# Patient Record
Sex: FEMALE | Race: WHITE | Employment: OTHER | URBAN - NONMETROPOLITAN AREA
[De-identification: names, ages, dates, MRNs, and addresses within clinical notes are randomized per-mention and may not be internally consistent; named-entity substitution may affect disease eponyms.]

---

## 2019-07-26 ENCOUNTER — HOSPITAL ENCOUNTER (OUTPATIENT)
Dept: WOMENS IMAGING | Age: 64
Discharge: HOME | End: 2019-07-26
Payer: COMMERCIAL

## 2019-07-26 ENCOUNTER — HOSPITAL ENCOUNTER (OUTPATIENT)
Dept: INTERVENTIONAL RADIOLOGY/VASCULAR | Age: 64
Discharge: HOME | End: 2019-07-26
Payer: COMMERCIAL

## 2019-07-26 DIAGNOSIS — M81.0 OSTEOPOROSIS, UNSPECIFIED OSTEOPOROSIS TYPE, UNSPECIFIED PATHOLOGICAL FRACTURE PRESENCE: ICD-10-CM

## 2019-07-26 DIAGNOSIS — I65.29 STENOSIS OF CAROTID ARTERY, UNSPECIFIED LATERALITY: ICD-10-CM

## 2019-07-26 DIAGNOSIS — Z00.6 ENCOUNTER FOR EXAMINATION FOR NORMAL COMPARISON OR CONTROL IN CLINICAL RESEARCH PROGRAM: ICD-10-CM

## 2019-07-26 DIAGNOSIS — Z12.31 VISIT FOR SCREENING MAMMOGRAM: ICD-10-CM

## 2019-07-26 PROCEDURE — 93880 EXTRACRANIAL BILAT STUDY: CPT

## 2019-07-26 PROCEDURE — 3209999900 MAM COMPARISON OF OUTSIDE IMAGES

## 2019-07-26 PROCEDURE — 77080 DXA BONE DENSITY AXIAL: CPT

## 2019-07-26 PROCEDURE — 77063 BREAST TOMOSYNTHESIS BI: CPT

## 2020-05-05 ENCOUNTER — HOSPITAL ENCOUNTER (OUTPATIENT)
Dept: NON INVASIVE DIAGNOSTICS | Age: 65
Discharge: HOME | End: 2020-05-05
Payer: COMMERCIAL

## 2020-05-05 VITALS — HEIGHT: 63 IN | BODY MASS INDEX: 31.18 KG/M2 | WEIGHT: 176 LBS

## 2020-05-05 LAB
LV EF: 60 %
LVEF MODALITY: NORMAL

## 2020-05-05 PROCEDURE — 93017 CV STRESS TEST TRACING ONLY: CPT | Performed by: INTERNAL MEDICINE

## 2020-05-05 PROCEDURE — A9500 TC99M SESTAMIBI: HCPCS | Performed by: FAMILY MEDICINE

## 2020-05-05 PROCEDURE — 3430000000 HC RX DIAGNOSTIC RADIOPHARMACEUTICAL: Performed by: FAMILY MEDICINE

## 2020-05-05 PROCEDURE — 78452 HT MUSCLE IMAGE SPECT MULT: CPT

## 2020-05-05 RX ADMIN — Medication 9.6 MILLICURIE: at 08:45

## 2020-05-05 RX ADMIN — Medication 33.1 MILLICURIE: at 09:55

## 2020-08-21 ENCOUNTER — HOSPITAL ENCOUNTER (OUTPATIENT)
Dept: WOMENS IMAGING | Age: 65
Discharge: HOME | End: 2020-08-21
Payer: COMMERCIAL

## 2020-08-21 PROCEDURE — 77067 SCR MAMMO BI INCL CAD: CPT

## 2020-09-23 ENCOUNTER — TELEPHONE (OUTPATIENT)
Dept: CASE MANAGEMENT | Age: 65
End: 2020-09-23

## 2020-09-23 NOTE — TELEPHONE ENCOUNTER
Name: Kellie Sainz  : 1955  MRN: [id number]    Oncology Navigation Follow-Up Note    Contact Type:  Telephone    Notes: Wants to wait on genetic evaluation she requested during mammogram visit 2020. Encouraged her to call if changes mind. Has contact information.     Electronically signed by Adi Gardner RN on 2020 at 12:24 PM

## 2020-09-23 NOTE — TELEPHONE ENCOUNTER
Name: Darnell Brady  : 1955  MRN: [id number]    Oncology Navigation Follow-Up Note    Contact Type:  Telephone    Notes: Attempted to reach patient re: request for genetic evaluation on Breast Cancer Risk Assessment, form completed 2020 at [facility] during mammogram visit. No answer, recording \"Voicemail full cannot accept new messages. Try your call again later. \"    Electronically signed by Rubio Danielson RN on 2020 at 11:44 AM

## 2021-09-03 ENCOUNTER — HOSPITAL ENCOUNTER (OUTPATIENT)
Dept: MAMMOGRAPHY | Age: 66
Discharge: HOME | End: 2021-09-03
Payer: COMMERCIAL

## 2021-09-03 DIAGNOSIS — Z12.39 ENCOUNTER FOR OTHER SCREENING FOR MALIGNANT NEOPLASM OF BREAST: ICD-10-CM

## 2021-09-03 PROCEDURE — 77063 BREAST TOMOSYNTHESIS BI: CPT

## 2021-09-08 ENCOUNTER — HOSPITAL ENCOUNTER (OUTPATIENT)
Dept: WOMENS IMAGING | Age: 66
Discharge: HOME | End: 2021-09-08

## 2021-09-08 DIAGNOSIS — R92.2 BREAST DENSITY: ICD-10-CM

## 2021-09-08 PROCEDURE — 76642 ULTRASOUND BREAST LIMITED: CPT

## 2021-09-08 PROCEDURE — 77065 DX MAMMO INCL CAD UNI: CPT

## 2022-01-26 ENCOUNTER — HOSPITAL ENCOUNTER (OUTPATIENT)
Dept: GENERAL RADIOLOGY | Age: 67
Discharge: HOME | End: 2022-01-26
Payer: MEDICARE

## 2022-01-26 ENCOUNTER — HOSPITAL ENCOUNTER (OUTPATIENT)
Age: 67
Discharge: HOME | End: 2022-01-26
Payer: MEDICARE

## 2022-01-26 DIAGNOSIS — R52 PAIN: ICD-10-CM

## 2022-01-26 PROCEDURE — 73030 X-RAY EXAM OF SHOULDER: CPT

## 2022-01-31 ENCOUNTER — HOSPITAL ENCOUNTER (OUTPATIENT)
Dept: WOMENS IMAGING | Age: 67
Discharge: HOME | End: 2022-01-31
Payer: MEDICARE

## 2022-01-31 DIAGNOSIS — N60.02 BREAST CYST, LEFT: ICD-10-CM

## 2022-01-31 DIAGNOSIS — Z09 FOLLOW UP: ICD-10-CM

## 2022-01-31 PROCEDURE — 76642 ULTRASOUND BREAST LIMITED: CPT

## 2022-01-31 PROCEDURE — G0279 TOMOSYNTHESIS, MAMMO: HCPCS

## 2022-02-08 ENCOUNTER — HOSPITAL ENCOUNTER (OUTPATIENT)
Dept: WOMENS IMAGING | Age: 67
Discharge: HOME | End: 2022-02-08
Payer: MEDICARE

## 2022-02-08 DIAGNOSIS — N63.25 MASS OVERLAPPING MULTIPLE QUADRANTS OF LEFT BREAST: ICD-10-CM

## 2022-02-08 PROCEDURE — 19084 BX BREAST ADD LESION US IMAG: CPT

## 2022-02-08 PROCEDURE — G0279 TOMOSYNTHESIS, MAMMO: HCPCS

## 2022-02-08 PROCEDURE — C1894 INTRO/SHEATH, NON-LASER: HCPCS

## 2022-02-08 PROCEDURE — 88305 TISSUE EXAM BY PATHOLOGIST: CPT

## 2022-02-08 NOTE — PROGRESS NOTES
Breast Biopsy Flowsheet/Post-Operative Care    Date of Procedure: 2/8/2022  Physician: Dr. Zeus Manzano  Technologist: Deloris Walsh RDMS    Biopsy:ultrasound guided breast biopsy  Lesion type: Non-palpable  Breast: left    Clock face position: Site #1: 3:00   UOQ     Site #2: 12:00  (2 adjacent areas) biopsied as one        Primary Method of Detection: Ultrasound       Asymmetry: asymmetric    Biopsy Method:   Sertera:    Site # 1    Gauge: 14    # of Passes: 4     Clip: Francisco Javier Maxweller:    Site # 2    Gauge: 14    # of Passes: 5     Clip:  Salome       Pre-Op Assessment: (BI-RADS)   4. Suspicious Abnormality    Patient Tolerated Procedure: good  Complications: none  Comments: none    Post Operative Care  Steri strips: Yes  Dressing: Gauze, Tape   Ice Applied to Site:  Yes  Evidence of Bleeding:  No    Pain Verbalized: No      Written Discharge Instructions: Yes  Condition at Discharge: good  Time of Discharge: 3043    Electronically signed by Sonali Pac on 2/8/2022 at 8:37 AM

## 2022-02-09 ENCOUNTER — CLINICAL DOCUMENTATION (OUTPATIENT)
Dept: WOMENS IMAGING | Age: 67
End: 2022-02-09

## 2022-02-09 NOTE — PROGRESS NOTES
Contact Type: [facility]    Contact Information: Arrived alone for biopsy results. After discussing with  Dr. Micah Carr, I discussed pathology and recommended excisional biopsy. All cards given for surgeons. Diagnosis: Intraductal papilloma    Patient Expresses Need(s) For:  Going to talk to her daughter about which surgeon to go to. Requests Referral to Doctor(s) with appointment(s): she will be calling us back soon for referral.  I told her if we did not hear back by Monday the 14th we would reach out to her. Biopsy site status: she was sore last night but feeling better today     Teaching sheets provided: Excisional biopsy, Needle localization, Papilloma    Notes: Explained terms doctor and navigators will discuss at next appointments. Questions addressed.        Results faxed to:  Dr. Sarita Bello

## 2022-02-22 ENCOUNTER — TELEPHONE (OUTPATIENT)
Dept: SURGERY | Age: 67
End: 2022-02-22

## 2022-02-22 ENCOUNTER — OFFICE VISIT (OUTPATIENT)
Dept: SURGERY | Age: 67
End: 2022-02-22
Payer: MEDICARE

## 2022-02-22 VITALS
HEART RATE: 77 BPM | HEIGHT: 63 IN | TEMPERATURE: 97.6 F | OXYGEN SATURATION: 98 % | BODY MASS INDEX: 33.22 KG/M2 | RESPIRATION RATE: 18 BRPM | SYSTOLIC BLOOD PRESSURE: 130 MMHG | DIASTOLIC BLOOD PRESSURE: 62 MMHG | WEIGHT: 187.5 LBS

## 2022-02-22 DIAGNOSIS — Z01.818 PRE-OP TESTING: ICD-10-CM

## 2022-02-22 DIAGNOSIS — D24.2 PAPILLOMA OF LEFT BREAST: Primary | ICD-10-CM

## 2022-02-22 PROCEDURE — 99202 OFFICE O/P NEW SF 15 MIN: CPT | Performed by: SURGERY

## 2022-02-22 RX ORDER — ACETAMINOPHEN 500 MG
500 TABLET ORAL EVERY 6 HOURS PRN
COMMUNITY

## 2022-02-22 RX ORDER — LEVOTHYROXINE SODIUM 50 UG/1
CAPSULE ORAL DAILY
COMMUNITY

## 2022-02-22 RX ORDER — MELOXICAM 15 MG/1
15 TABLET ORAL DAILY
COMMUNITY

## 2022-02-22 RX ORDER — MULTIVIT WITH MINERALS/LUTEIN
250 TABLET ORAL DAILY
COMMUNITY

## 2022-02-22 RX ORDER — LISINOPRIL 10 MG/1
10 TABLET ORAL DAILY
COMMUNITY

## 2022-02-22 RX ORDER — LANOLIN ALCOHOL/MO/W.PET/CERES
1000 CREAM (GRAM) TOPICAL DAILY
COMMUNITY

## 2022-02-22 RX ORDER — NAPROXEN 250 MG/1
250 TABLET ORAL PRN
COMMUNITY

## 2022-02-22 RX ORDER — CHOLECALCIFEROL (VITAMIN D3) 125 MCG
CAPSULE ORAL DAILY
COMMUNITY

## 2022-02-22 NOTE — TELEPHONE ENCOUNTER
Patient scheduled for surgery with Dr. Renee Diaz on 03- at 10:30am with an arrival of 7:30am at [facility] for a 8:00am needle loc. Preop order and instructions given. Surgery consent signed. Antibacterial soap given. Patient desired to postpone surgery do to going through therapy on her left arm.

## 2022-02-22 NOTE — TELEPHONE ENCOUNTER
[address]    Phone * [phone]   Surgical Scheduling Direct line Phone * [phone]  Fax * [phone]      Freda Ferguson      1955    female    [address] [facility] [address]   Marital Status:      Home Phone: [phone]   Cell Phone:   Telephone Information:   Mobile [phone]              Surgeon: Dr. Ricarda Dance  Surgery Date:03-   Time: 10:30am     Procedure: Left lumpectomy with preop needle localization at 8:00am   Outpatient     Diagnosis: Left breast papilloma    Important Medical History: In Epic    Special Inst/Equip:     CPT Codes: 17231    Latex Allergy:   no Cardiac Device:  no    Anesthesia Type: General    Case Location:  Main OR     Preadmission Testing: Phone Call      PAT Date and Time: ________________________________    PAT Confirmation #: _________________________________    Post Op Visit:  ______________________________________    Need Preop Cardiac Clearance:   no    Does patient have Cardiologist/physician?  No      Surgery Conformation #:  ______________________________________________    : __________________________________ Date:____________________        [employer] Name:  [employer]

## 2022-02-22 NOTE — PROGRESS NOTES
[address] [address]  Dept: [phone]  Dept Fax: [phone]  Loc: [phone]    Visit Date: 2/22/2022    Jostin Taylor is a 77 y.o. female who presentstoday for:  Chief Complaint   Patient presents with    Surgical Consult     New Patient referred by [facility] for Papilloma of left breast. Mammogram with needle biopsy completed 2-8-22. HPI:     HPI 26-year-old white female soon-to-be 79years old whose mother did have breast cancer and also has a daughter who is BRCA positive. Patient recently had a mammogram and then had an ultrasound recommended ultrasound of the left breast revealed a nodule at the 3 o'clock position and 2 nodules at the 12 o'clock position that were adjacent to each other she went biopsies of both areas. 3:00 proved to be fibroglandular tissue benign but at the 12 o'clock position also was shown to have fibrocystic changes but an intraductal papilloma. Patient had some nipple discharge with compression during the mammograms given the papilloma in the family history it was recommended she have this area removed completely and she is here for that discussion.   Patient had 4 pregnancies with the first child at age 25 the last at age 25 she had her onset of menses at 15 she did not breast-feed she did take birth control between the ages of 25 and 25 and then had a tubal ligation she has never had estrogen replacement and a pause was in her 46s at some point time she did have a previous breast biopsy in 05 per  that proved to be benign as mentioned she has a mother with breast cancer a father with prostate cancer and a daughter who is BRCA1 positive she has been followed with colonoscopies she is a former smoker    Past Medical History:   Diagnosis Date    HTN (hypertension)     Hypothyroid     Papilloma 2022    left breast      Past Surgical History:   Procedure Laterality Date    BACK SURGERY  2011    BREAST SURGERY Left 2005    fatty tumor    CARDIOVASCULAR STRESS TEST      CARPAL TUNNEL RELEASE Left 1992     SECTION      x 4    COLONOSCOPY  07/10/2017    Dr. Hernandez Rodríguez Left 2015    FOOT SURGERY Left 2017    PEG US GUID NDL BIOPSY LEFT Left 2022    PEG [address] LEFT 2022 Brian [facility] BREAST BIOPSY NEEDLE ADDITIONAL LEFT Left 2022    US BREAST BIOPSY NEEDLE ADDITIONAL LEFT 2022 Brian Elizabeth [facility]        Family History   Problem Relation Age of Onset    Breast Cancer Mother 79    Prostate Cancer Father     Hypertension Brother     Stroke Brother     No Known Problems Brother     No Known Problems Brother     No Known Problems Maternal Grandmother     No Known Problems Maternal Grandfather     No Known Problems Paternal Grandmother     No Known Problems Paternal Grandfather     BRCA 1 Positive Daughter         Social History     Tobacco Use    Smoking status: Former Smoker     Quit date: 9/3/2011     Years since quitting: 10.4    Smokeless tobacco: Never Used   Substance Use Topics    Alcohol use: Not Currently          No current outpatient medications on file. No current facility-administered medications for this visit. No Known Allergies    Subjective:      Review of Systems   Constitutional: Negative. Negative for activity change, appetite change, chills, diaphoresis, fatigue, fever and unexpected weight change. HENT: Negative. Negative for congestion, dental problem, drooling, ear discharge, ear pain, facial swelling, hearing loss, mouth sores, nosebleeds, postnasal drip, rhinorrhea, sinus pressure, sinus pain, sneezing, sore throat, tinnitus, trouble swallowing and voice change. Eyes: Negative. Negative for photophobia, pain, discharge, redness, itching and visual disturbance. Respiratory: Negative. Negative for apnea, cough, choking, chest tightness, shortness of breath, wheezing and stridor. Cardiovascular: Negative. Negative for chest pain, palpitations and leg swelling. Gastrointestinal: Negative. Endocrine: Negative. Negative for cold intolerance, heat intolerance, polydipsia, polyphagia and polyuria. Genitourinary: Negative. Negative for decreased urine volume, difficulty urinating, dyspareunia, dysuria, enuresis, flank pain, frequency, genital sores, hematuria, menstrual problem, pelvic pain, urgency, vaginal bleeding, vaginal discharge and vaginal pain. Musculoskeletal: Negative. Negative for arthralgias, back pain, gait problem, joint swelling, myalgias, neck pain and neck stiffness. Skin: Negative. Negative for color change, pallor, rash and wound. Allergic/Immunologic: Negative. Negative for environmental allergies, food allergies and immunocompromised state. Neurological: Negative. Negative for dizziness, tremors, seizures, syncope, facial asymmetry, speech difficulty, weakness, light-headedness, numbness and headaches. Hematological: Negative. Negative for adenopathy. Does not bruise/bleed easily. Psychiatric/Behavioral: Negative. Negative for agitation, behavioral problems, confusion, decreased concentration, dysphoric mood, hallucinations, self-injury, sleep disturbance and suicidal ideas. The patient is not nervous/anxious and is not hyperactive. Objective:   /62 (Site: Right Upper Arm, Position: Sitting, Cuff Size: Medium Adult)   Pulse 77   Temp 97.6 °F (36.4 °C) (Temporal)   Resp 18   Ht 5' 3\" (1.6 m)   Wt 187 lb 8 oz (85 kg)   SpO2 98%   BMI 33.21 kg/m²     Physical Exam  Constitutional:       Appearance: She is well-developed. HENT:      Head: Normocephalic and atraumatic. Eyes:      General: No scleral icterus. Right eye: No discharge. Left eye: No discharge.       Conjunctiva/sclera: Conjunctivae normal.      Pupils: Pupils are equal, round, and reactive to light. Neck:      Thyroid: No thyromegaly. Vascular: No JVD. Cardiovascular:      Rate and Rhythm: Normal rate and regular rhythm. Heart sounds: No murmur heard. No friction rub. No gallop. Pulmonary:      Effort: Pulmonary effort is normal. No respiratory distress. Breath sounds: Normal breath sounds. No stridor. No wheezing. Chest:      Chest wall: No tenderness. Abdominal:      General: There is no distension. Palpations: There is no mass. Tenderness: There is no abdominal tenderness. There is no guarding or rebound. Hernia: No hernia is present. Musculoskeletal:         General: Normal range of motion. Cervical back: Normal range of motion and neck supple. Skin:     General: Skin is warm and dry. Coloration: Skin is not pale. Findings: No erythema or rash. Neurological:      Mental Status: She is alert and oriented to person, place, and time. Psychiatric:         Behavior: Behavior normal.         Thought Content: Thought content normal.         Judgment: Judgment normal.            Results for orders placed or performed in visit on 02/18/22   Comprehensive Metabolic Panel   Result Value Ref Range    Glucose 93 65 - 99 mg/dL    BUN 19 5 - 27 mg/dL    CREATININE 0.57 0.40 - 1.00 mg/dL    eGFR African American >60 >59 ml/min/1.73sq.m    EGFR IF NonAfrican American >60 >59 ml/min/1.73sq. m    Calcium 9.0 8.5 - 10.5 mg/dL    Sodium 141 134 - 146 mmol/L    Potassium 4.3 3.5 - 5.0 mmol/L    Chloride 104 98 - 109 mmol/L    CO2 28 22 - 32 mmol/L    Anion Gap 9 5 - 15 mmol/L    Total Bilirubin 0.6 0.3 - 1.2 mg/dL    Alk Phosphatase 80 39 - 130 U/L    AST 17 0 - 41 U/L    ALT 21 0 - 31 U/L    Total Protein 6.9 6.0 - 8.0 g/dL    Albumin 4.0 3.2 - 5.3 g/dL   Lipid Panel w/ Reflex Direct LDL   Result Value Ref Range    Cholesterol 226 (H) 150 - 200 mg/dL    Triglycerides 157 (H) 27 -

## 2022-03-01 ENCOUNTER — TELEPHONE (OUTPATIENT)
Dept: SURGERY | Age: 67
End: 2022-03-01

## 2022-03-13 ASSESSMENT — ENCOUNTER SYMPTOMS
RESPIRATORY NEGATIVE: 1
RHINORRHEA: 0
SORE THROAT: 0
EYE REDNESS: 0
GASTROINTESTINAL NEGATIVE: 1
PHOTOPHOBIA: 0
BACK PAIN: 0
SINUS PAIN: 0
TROUBLE SWALLOWING: 0
EYES NEGATIVE: 1
FACIAL SWELLING: 0
CHOKING: 0
SHORTNESS OF BREATH: 0
CHEST TIGHTNESS: 0
COUGH: 0
EYE DISCHARGE: 0
STRIDOR: 0
ALLERGIC/IMMUNOLOGIC NEGATIVE: 1
EYE PAIN: 0
APNEA: 0
COLOR CHANGE: 0
WHEEZING: 0
EYE ITCHING: 0
SINUS PRESSURE: 0
VOICE CHANGE: 0

## 2022-03-18 ENCOUNTER — HOSPITAL ENCOUNTER (OUTPATIENT)
Age: 67
Discharge: HOME | End: 2022-03-18
Payer: MEDICARE

## 2022-03-18 DIAGNOSIS — Z01.818 PRE-OP TESTING: ICD-10-CM

## 2022-03-18 DIAGNOSIS — D24.2 PAPILLOMA OF LEFT BREAST: ICD-10-CM

## 2022-03-18 LAB
HCT VFR BLD CALC: 45.9 % (ref 37–47)
HEMOGLOBIN: 14.6 GM/DL (ref 12–16)

## 2022-03-18 PROCEDURE — 93005 ELECTROCARDIOGRAM TRACING: CPT

## 2022-03-18 PROCEDURE — 85014 HEMATOCRIT: CPT

## 2022-03-18 PROCEDURE — 36415 COLL VENOUS BLD VENIPUNCTURE: CPT

## 2022-03-18 PROCEDURE — 85018 HEMOGLOBIN: CPT

## 2022-03-19 LAB
EKG ATRIAL RATE: 89 BPM
EKG P AXIS: 11 DEGREES
EKG P-R INTERVAL: 166 MS
EKG Q-T INTERVAL: 366 MS
EKG QRS DURATION: 74 MS
EKG QTC CALCULATION (BAZETT): 445 MS
EKG R AXIS: 47 DEGREES
EKG T AXIS: 7 DEGREES
EKG VENTRICULAR RATE: 89 BPM

## 2022-03-19 PROCEDURE — 93010 ELECTROCARDIOGRAM REPORT: CPT | Performed by: INTERNAL MEDICINE

## 2022-03-21 ENCOUNTER — PREP FOR PROCEDURE (OUTPATIENT)
Dept: SURGERY | Age: 67
End: 2022-03-21

## 2022-03-21 RX ORDER — SODIUM CHLORIDE 9 MG/ML
INJECTION, SOLUTION INTRAVENOUS CONTINUOUS
Status: CANCELLED | OUTPATIENT
Start: 2022-03-21

## 2022-03-28 ENCOUNTER — ANESTHESIA (OUTPATIENT)
Dept: OPERATING ROOM | Age: 67
End: 2022-03-28
Payer: MEDICARE

## 2022-03-28 ENCOUNTER — HOSPITAL ENCOUNTER (OUTPATIENT)
Dept: WOMENS IMAGING | Age: 67
Discharge: HOME | End: 2022-03-28
Payer: MEDICARE

## 2022-03-28 ENCOUNTER — HOSPITAL ENCOUNTER (OUTPATIENT)
Age: 67
Setting detail: OUTPATIENT SURGERY
Discharge: HOME | End: 2022-03-28
Attending: SURGERY | Admitting: SURGERY
Payer: MEDICARE

## 2022-03-28 ENCOUNTER — ANESTHESIA EVENT (OUTPATIENT)
Dept: OPERATING ROOM | Age: 67
End: 2022-03-28
Payer: MEDICARE

## 2022-03-28 ENCOUNTER — TELEPHONE (OUTPATIENT)
Dept: SURGERY | Age: 67
End: 2022-03-28

## 2022-03-28 VITALS
HEART RATE: 75 BPM | DIASTOLIC BLOOD PRESSURE: 60 MMHG | TEMPERATURE: 98.6 F | RESPIRATION RATE: 20 BRPM | BODY MASS INDEX: 33.45 KG/M2 | SYSTOLIC BLOOD PRESSURE: 133 MMHG | HEIGHT: 63 IN | OXYGEN SATURATION: 94 % | WEIGHT: 188.8 LBS

## 2022-03-28 VITALS — DIASTOLIC BLOOD PRESSURE: 65 MMHG | OXYGEN SATURATION: 98 % | SYSTOLIC BLOOD PRESSURE: 137 MMHG

## 2022-03-28 DIAGNOSIS — D24.2 PAPILLOMA OF LEFT BREAST: ICD-10-CM

## 2022-03-28 DIAGNOSIS — N63.0 BREAST MASS: Primary | ICD-10-CM

## 2022-03-28 LAB — POTASSIUM SERPL-SCNC: 4.3 MEQ/L (ref 3.5–5.2)

## 2022-03-28 PROCEDURE — 19301 PARTIAL MASTECTOMY: CPT | Performed by: SURGERY

## 2022-03-28 PROCEDURE — 3700000000 HC ANESTHESIA ATTENDED CARE: Performed by: SURGERY

## 2022-03-28 PROCEDURE — 36415 COLL VENOUS BLD VENIPUNCTURE: CPT

## 2022-03-28 PROCEDURE — 7100000010 HC PHASE II RECOVERY - FIRST 15 MIN: Performed by: SURGERY

## 2022-03-28 PROCEDURE — 2709999900 HC NON-CHARGEABLE SUPPLY: Performed by: SURGERY

## 2022-03-28 PROCEDURE — 3700000001 HC ADD 15 MINUTES (ANESTHESIA): Performed by: SURGERY

## 2022-03-28 PROCEDURE — 76098 X-RAY EXAM SURGICAL SPECIMEN: CPT

## 2022-03-28 PROCEDURE — 19285 PERQ DEV BREAST 1ST US IMAG: CPT

## 2022-03-28 PROCEDURE — 3600000002 HC SURGERY LEVEL 2 BASE: Performed by: SURGERY

## 2022-03-28 PROCEDURE — G0279 TOMOSYNTHESIS, MAMMO: HCPCS

## 2022-03-28 PROCEDURE — 7100000011 HC PHASE II RECOVERY - ADDTL 15 MIN: Performed by: SURGERY

## 2022-03-28 PROCEDURE — 6360000002 HC RX W HCPCS

## 2022-03-28 PROCEDURE — 6370000000 HC RX 637 (ALT 250 FOR IP)

## 2022-03-28 PROCEDURE — 2580000003 HC RX 258

## 2022-03-28 PROCEDURE — 2580000003 HC RX 258: Performed by: NURSE ANESTHETIST, CERTIFIED REGISTERED

## 2022-03-28 PROCEDURE — 2500000003 HC RX 250 WO HCPCS: Performed by: SURGERY

## 2022-03-28 PROCEDURE — 88307 TISSUE EXAM BY PATHOLOGIST: CPT

## 2022-03-28 PROCEDURE — 7100000000 HC PACU RECOVERY - FIRST 15 MIN: Performed by: SURGERY

## 2022-03-28 PROCEDURE — 7100000001 HC PACU RECOVERY - ADDTL 15 MIN: Performed by: SURGERY

## 2022-03-28 PROCEDURE — 6360000002 HC RX W HCPCS: Performed by: NURSE ANESTHETIST, CERTIFIED REGISTERED

## 2022-03-28 PROCEDURE — 84132 ASSAY OF SERUM POTASSIUM: CPT

## 2022-03-28 PROCEDURE — 3600000012 HC SURGERY LEVEL 2 ADDTL 15MIN: Performed by: SURGERY

## 2022-03-28 RX ORDER — SODIUM CHLORIDE 0.9 % (FLUSH) 0.9 %
5-40 SYRINGE (ML) INJECTION PRN
Status: DISCONTINUED | OUTPATIENT
Start: 2022-03-28 | End: 2022-03-28 | Stop reason: HOSPADM

## 2022-03-28 RX ORDER — FENTANYL CITRATE 50 UG/ML
INJECTION, SOLUTION INTRAMUSCULAR; INTRAVENOUS PRN
Status: DISCONTINUED | OUTPATIENT
Start: 2022-03-28 | End: 2022-03-28 | Stop reason: SDUPTHER

## 2022-03-28 RX ORDER — ONDANSETRON 2 MG/ML
INJECTION INTRAMUSCULAR; INTRAVENOUS PRN
Status: DISCONTINUED | OUTPATIENT
Start: 2022-03-28 | End: 2022-03-28 | Stop reason: SDUPTHER

## 2022-03-28 RX ORDER — SODIUM CHLORIDE 9 MG/ML
INJECTION, SOLUTION INTRAVENOUS CONTINUOUS
Status: DISCONTINUED | OUTPATIENT
Start: 2022-03-28 | End: 2022-03-28 | Stop reason: HOSPADM

## 2022-03-28 RX ORDER — BUPIVACAINE HYDROCHLORIDE 5 MG/ML
INJECTION, SOLUTION PERINEURAL PRN
Status: DISCONTINUED | OUTPATIENT
Start: 2022-03-28 | End: 2022-03-28 | Stop reason: ALTCHOICE

## 2022-03-28 RX ORDER — ONDANSETRON 4 MG/1
4 TABLET, ORALLY DISINTEGRATING ORAL EVERY 8 HOURS PRN
Qty: 24 TABLET | Refills: 0 | Status: SHIPPED | OUTPATIENT
Start: 2022-03-28

## 2022-03-28 RX ORDER — LIDOCAINE HYDROCHLORIDE 20 MG/ML
INJECTION, SOLUTION INTRAVENOUS PRN
Status: DISCONTINUED | OUTPATIENT
Start: 2022-03-28 | End: 2022-03-28 | Stop reason: SDUPTHER

## 2022-03-28 RX ORDER — OXYCODONE HYDROCHLORIDE AND ACETAMINOPHEN 5; 325 MG/1; MG/1
1 TABLET ORAL ONCE
Status: COMPLETED | OUTPATIENT
Start: 2022-03-28 | End: 2022-03-28

## 2022-03-28 RX ORDER — SODIUM CHLORIDE, SODIUM LACTATE, POTASSIUM CHLORIDE, CALCIUM CHLORIDE 600; 310; 30; 20 MG/100ML; MG/100ML; MG/100ML; MG/100ML
INJECTION, SOLUTION INTRAVENOUS CONTINUOUS PRN
Status: DISCONTINUED | OUTPATIENT
Start: 2022-03-28 | End: 2022-03-28 | Stop reason: SDUPTHER

## 2022-03-28 RX ORDER — FENTANYL CITRATE 50 UG/ML
50 INJECTION, SOLUTION INTRAMUSCULAR; INTRAVENOUS EVERY 5 MIN PRN
Status: DISCONTINUED | OUTPATIENT
Start: 2022-03-28 | End: 2022-03-28 | Stop reason: HOSPADM

## 2022-03-28 RX ORDER — OXYCODONE HYDROCHLORIDE AND ACETAMINOPHEN 5; 325 MG/1; MG/1
TABLET ORAL
Status: COMPLETED
Start: 2022-03-28 | End: 2022-03-28

## 2022-03-28 RX ORDER — SODIUM CHLORIDE 9 MG/ML
INJECTION, SOLUTION INTRAVENOUS PRN
Status: DISCONTINUED | OUTPATIENT
Start: 2022-03-28 | End: 2022-03-28 | Stop reason: HOSPADM

## 2022-03-28 RX ORDER — MIDAZOLAM HYDROCHLORIDE 1 MG/ML
INJECTION INTRAMUSCULAR; INTRAVENOUS PRN
Status: DISCONTINUED | OUTPATIENT
Start: 2022-03-28 | End: 2022-03-28 | Stop reason: SDUPTHER

## 2022-03-28 RX ORDER — SODIUM CHLORIDE 0.9 % (FLUSH) 0.9 %
5-40 SYRINGE (ML) INJECTION EVERY 12 HOURS SCHEDULED
Status: DISCONTINUED | OUTPATIENT
Start: 2022-03-28 | End: 2022-03-28 | Stop reason: HOSPADM

## 2022-03-28 RX ORDER — PROPOFOL 10 MG/ML
INJECTION, EMULSION INTRAVENOUS PRN
Status: DISCONTINUED | OUTPATIENT
Start: 2022-03-28 | End: 2022-03-28 | Stop reason: SDUPTHER

## 2022-03-28 RX ORDER — FENTANYL CITRATE 50 UG/ML
25 INJECTION, SOLUTION INTRAMUSCULAR; INTRAVENOUS EVERY 5 MIN PRN
Status: DISCONTINUED | OUTPATIENT
Start: 2022-03-28 | End: 2022-03-28 | Stop reason: HOSPADM

## 2022-03-28 RX ORDER — ONDANSETRON 2 MG/ML
4 INJECTION INTRAMUSCULAR; INTRAVENOUS
Status: DISCONTINUED | OUTPATIENT
Start: 2022-03-28 | End: 2022-03-28 | Stop reason: HOSPADM

## 2022-03-28 RX ORDER — MEPERIDINE HYDROCHLORIDE 25 MG/ML
12.5 INJECTION INTRAMUSCULAR; INTRAVENOUS; SUBCUTANEOUS EVERY 5 MIN PRN
Status: DISCONTINUED | OUTPATIENT
Start: 2022-03-28 | End: 2022-03-28 | Stop reason: HOSPADM

## 2022-03-28 RX ORDER — OXYCODONE HYDROCHLORIDE AND ACETAMINOPHEN 5; 325 MG/1; MG/1
1 TABLET ORAL EVERY 6 HOURS PRN
Qty: 12 TABLET | Refills: 0 | Status: SHIPPED | OUTPATIENT
Start: 2022-03-28 | End: 2022-04-09

## 2022-03-28 RX ORDER — DEXAMETHASONE SODIUM PHOSPHATE 10 MG/ML
INJECTION, EMULSION INTRAMUSCULAR; INTRAVENOUS PRN
Status: DISCONTINUED | OUTPATIENT
Start: 2022-03-28 | End: 2022-03-28 | Stop reason: SDUPTHER

## 2022-03-28 RX ADMIN — OXYCODONE AND ACETAMINOPHEN 1 TABLET: 5; 325 TABLET ORAL at 13:11

## 2022-03-28 RX ADMIN — PHENYLEPHRINE HYDROCHLORIDE 200 MCG: 10 INJECTION INTRAVENOUS at 11:29

## 2022-03-28 RX ADMIN — PHENYLEPHRINE HYDROCHLORIDE 200 MCG: 10 INJECTION INTRAVENOUS at 11:42

## 2022-03-28 RX ADMIN — PHENYLEPHRINE HYDROCHLORIDE 200 MCG: 10 INJECTION INTRAVENOUS at 11:19

## 2022-03-28 RX ADMIN — LIDOCAINE HYDROCHLORIDE 100 MG: 20 INJECTION, SOLUTION INTRAVENOUS at 11:07

## 2022-03-28 RX ADMIN — FENTANYL CITRATE 100 MCG: 50 INJECTION, SOLUTION INTRAMUSCULAR; INTRAVENOUS at 11:07

## 2022-03-28 RX ADMIN — DEXAMETHASONE SODIUM PHOSPHATE 10 MG: 10 INJECTION, EMULSION INTRAMUSCULAR; INTRAVENOUS at 11:21

## 2022-03-28 RX ADMIN — SODIUM CHLORIDE: 9 INJECTION, SOLUTION INTRAVENOUS at 09:10

## 2022-03-28 RX ADMIN — MIDAZOLAM 2 MG: 1 INJECTION INTRAMUSCULAR; INTRAVENOUS at 11:00

## 2022-03-28 RX ADMIN — PROPOFOL 150 MG: 10 INJECTION, EMULSION INTRAVENOUS at 11:07

## 2022-03-28 RX ADMIN — SODIUM CHLORIDE, POTASSIUM CHLORIDE, SODIUM LACTATE AND CALCIUM CHLORIDE: 600; 310; 30; 20 INJECTION, SOLUTION INTRAVENOUS at 11:27

## 2022-03-28 RX ADMIN — ONDANSETRON 4 MG: 2 INJECTION INTRAMUSCULAR; INTRAVENOUS at 11:40

## 2022-03-28 RX ADMIN — OXYCODONE HYDROCHLORIDE AND ACETAMINOPHEN 1 TABLET: 5; 325 TABLET ORAL at 13:11

## 2022-03-28 RX ADMIN — Medication 2000 MG: at 11:15

## 2022-03-28 ASSESSMENT — PULMONARY FUNCTION TESTS
PIF_VALUE: 11
PIF_VALUE: 1
PIF_VALUE: 0
PIF_VALUE: 11
PIF_VALUE: 18
PIF_VALUE: 11
PIF_VALUE: 1
PIF_VALUE: 11
PIF_VALUE: 11
PIF_VALUE: 0
PIF_VALUE: 1
PIF_VALUE: 11
PIF_VALUE: 10
PIF_VALUE: 0
PIF_VALUE: 10
PIF_VALUE: 11
PIF_VALUE: 1
PIF_VALUE: 11
PIF_VALUE: 10
PIF_VALUE: 11
PIF_VALUE: 10
PIF_VALUE: 11
PIF_VALUE: 11
PIF_VALUE: 10
PIF_VALUE: 10
PIF_VALUE: 11
PIF_VALUE: 8
PIF_VALUE: 10
PIF_VALUE: 11
PIF_VALUE: 0
PIF_VALUE: 1
PIF_VALUE: 16
PIF_VALUE: 10
PIF_VALUE: 11
PIF_VALUE: 18
PIF_VALUE: 16
PIF_VALUE: 10
PIF_VALUE: 11
PIF_VALUE: 5
PIF_VALUE: 1
PIF_VALUE: 1
PIF_VALUE: 10
PIF_VALUE: 0
PIF_VALUE: 16

## 2022-03-28 ASSESSMENT — PAIN SCALES - GENERAL
PAINLEVEL_OUTOF10: 3
PAINLEVEL_OUTOF10: 3
PAINLEVEL_OUTOF10: 4
PAINLEVEL_OUTOF10: 6
PAINLEVEL_OUTOF10: 0

## 2022-03-28 ASSESSMENT — PAIN - FUNCTIONAL ASSESSMENT: PAIN_FUNCTIONAL_ASSESSMENT: 0-10

## 2022-03-28 NOTE — H&P
[address] [id number]                              HISTORY AND PHYSICAL    PATIENT NAME: Kalia Masterson                    :        1955  MED REC NO:   [id number]                           ROOM:  ACCOUNT NO:   [de-identified]                           ADMIT DATE: 2022  PROVIDER:     Jazlyn Omalley. Bill Lazaro MD    CHIEF COMPLAINT:  Intraductal papilloma, left breast; family history of  breast cancer. HISTORY OF PRESENT ILLNESS:  The patient is a 71-year-old white female  whose mother did have breast cancer, her daughter who is BRCA positive,  who on recent mammograms had nodules and then on ultrasound had a nodule  of the left breast at the 3 o'clock position and two nodules at the 12  o'clock position that were adjacent to each other. She underwent  biopsies of both areas. The one at 3 o'clock proved to be  fibroglandular tissue, benign. The 12 o'clock position has also had  fibrocystic changes, but an intraductal papilloma. Given the patient's  family history of breast cancer, it was felt this area should be removed  completely. She has had a previous breast biopsy performed in , but  she is here for needle localization and lumpectomy. PAST MEDICAL HISTORY:  Positive for hypertension, hypothyroidism. PAST SURGICAL HISTORY:  Includes  section x4. She has had a  carpal tunnel release. She has had lumbar disk surgery. She has had a  previous breast biopsy as mentioned above. She has had orthopedic  procedures on her left foot. She has had colonoscopies in the past.    FAMILY HISTORY:  Positive for breast cancer, prostate cancer,  hypertension. SOCIAL HISTORY:  The patient is a former smoker, quit 10-1/2 years ago. Alcohol:  None. MEDICATIONS:  None. PHYSICAL EXAMINATION:  GENERAL:  The patient is a 71-year-old female, appears her age.   HEAD, EARS, EYES, NOSE, AND THROAT:  Show no scleral icterus. CARDIOVASCULAR:  S1, S2.  LUNGS:  Respirations are clear. ABDOMEN:  Soft. Bowel sounds are positive. BREASTS:  Examining the breasts reveals no definitive palpable masses. There is some bruising from the previous biopsy. ASSESSMENT AND PLAN:  Left breast mass with intraductal papilloma with  family history of breast cancer. The patient is being admitted at this  time for needle localization, lumpectomy. CAMILLA BEE [facility], MD    D: 03/27/2022 20:44:27       T: 03/27/2022 20:47:44     [email or web address]  Job#: [id number]     Doc#: [id number]

## 2022-03-28 NOTE — ANESTHESIA POSTPROCEDURE EVALUATION
Department of Anesthesiology  Postprocedure Note    Patient: Cloretta Post  MRN: [id number]  YOB: 1955  Date of evaluation: 3/28/2022  Time:  1:23 PM     Procedure Summary     Date: 03/28/22 [facility] / [facility] [address] [address] [facility] [place] [facility]    Anesthesia Start: 1100 Anesthesia Stop: 1072    Procedure: LEFT BREAST LUMPECTOMY WITH PREOP NEEDLE LOC (Left Breast) Diagnosis: (LEFT BREAST PAPILLOMA)    Surgeons: Louie Mcclellan MD Responsible Provider: Pedro Tripathi DO    Anesthesia Type: general ASA Status: 3          Anesthesia Type: general    Reymundo Phase I: Reymundo Score: 10    Reymundo Phase II: Reymundo Score: 10    Last vitals: Reviewed and per EMR flowsheets.        Anesthesia Post Evaluation    Patient location during evaluation: PACU  Patient participation: complete - patient participated  Level of consciousness: awake and alert  Pain score: 3  Airway patency: patent  Nausea & Vomiting: no nausea and no vomiting  Complications: no  Cardiovascular status: hemodynamically stable and blood pressure returned to baseline  Respiratory status: spontaneous ventilation, room air and acceptable  Hydration status: stable

## 2022-03-28 NOTE — OP NOTE
[address] [id number]                                OPERATIVE REPORT    PATIENT NAME: Elida Bolanos                    :        1955  MED REC NO:   [id number]                           ROOM:  ACCOUNT NO:   [de-identified]                           ADMIT DATE: 2022  PROVIDER:     Miryam Haynes. Patrice Capone MD    DATE OF PROCEDURE:  2022    PREOPERATIVE DIAGNOSIS:  Intraductal papilloma of the left breast.    POSTOPERATIVE DIAGNOSIS:  Intraductal papilloma of the left breast.    OPERATIONS PERFORMED:  1. Preoperative needle localization. 2.  Lumpectomy left breast.    SURGEON:  Miryam Haynes. MD Jose Daniel    ANESTHESIA:  General.    COMPLICATIONS:  None. BLOOD LOSS:  5 mL. INDICATIONS FOR PROCEDURE:  The patient is a 80-year-old white female  who had an ultrasound and mammogram revealing a bilobed nodule in the  left breast.  Actually, there were two separate nodules; one was  biopsied was fibrocystic tissue, the other was biopsied was same, but  also had an intraductal papilloma element to it. The patient has a  strong family history of breast cancer in mother and daughter, and she  is here for lumpectomy. FINDINGS:  The [address] reported that the mass in question was  within the specimen given. DESCRIPTION OF PROCEDURE:  The patient was brought into the hospital and  went to the [address] [facility] where she underwent a needle localization. She was brought to Surgery and after adequate inhalational anesthesia  was administered, incision was made over the entrance of the guidewire  going down into the breast tissue and a core of breast tissue was  removed going down to the tip of the needle. The tip of the needle was  seen. The specimen was marked and sent for the [address] [facility]. Hemostasis was obtained with electrocautery and then we maintained some  pressure for a period of time.   Report came back that the mass in  question was within the specimen given and then the subcutaneous was  closed with interrupted 3-0 Vicryl sutures and running 4-0 Vicryl was  used to close the skin. Steri-Strips were applied. CAMILLA BEE [facility], MD    D: 03/28/2022 12:20:38       T: 03/28/2022 12:24:08     [email or web address]  Job#: [id number]     Doc#: [id number]    CC:

## 2022-03-28 NOTE — H&P
[facility]  History and Physical Update      Pt Name: Kelli White  MRN: [id number]  YOB: 1955  Date of evaluation: 3/27/2022    [x] I have examined the patient and reviewed the H&P/Consult and there are no changes to the patient or plans. [] I have examined the patient and reviewed the H&P/Consult and have noted the following changes:         Francy Baker MD  Electronically signed 3/27/2022 at 8:34 PM

## 2022-03-28 NOTE — PROGRESS NOTES
Pt admitted to [facility] room 10 and oriented to unit. SCD sleeves applied. Nares swabbed. Pt verbalized permission for first name, last initial and physicians name on white board. SDS board and discharge criteria explained, pt and family verbalized understanding. Pt denies thoughts of harming self or others. Call light in reach. Family at the bedside.

## 2022-03-28 NOTE — PROGRESS NOTES

## 2022-03-28 NOTE — BRIEF OP NOTE
Brief Postoperative Note      Patient: Shaun Chavez  YOB: 1955  MRN: [id number]    Date of Procedure: 3/28/2022    Pre-Op Diagnosis: LEFT BREAST PAPILLOMA    Post-Op Diagnosis: same       Procedure(s):  LEFT BREAST LUMPECTOMY WITH PREOP NEEDLE LOC    Surgeon(s):  Hayley Campo MD    Assistant:  First Assistant: Lynden Krabbe    Anesthesia: General    Estimated Blood Loss (mL): 10 ml    Complications: none    Specimens:   ID Type Source Tests Collected by Time Destination   A : LEFT BREAST TISSUE Tissue Breast SURGICAL PATHOLOGY Hayley Campo MD 3/28/2022 1027        Implants:        Drains: none    Findings: see op note    Electronically signed by Hayley Campo MD on 3/28/2022 at 12:03 PM

## 2022-03-28 NOTE — ANESTHESIA PRE PROCEDURE
Department of Anesthesiology  Preprocedure Note       Name:  Kalin Szymanski   Age:  79 y.o.  :  1955                                          MRN:  [id number]         Date:  3/28/2022      Surgeon: Reese Rankin):  Gela Bernstein MD    Procedure: Procedure(s):  LEFT BREAST LUMPECTOMY WITH PREOP NEEDLE LOC    Medications prior to admission:   Prior to Admission medications    Medication Sig Start Date End Date Taking? Authorizing Provider   Cholecalciferol (VITAMIN D3) 50 MCG (2000) TABS Take by mouth daily    Historical Provider, MD   Calcium Carb-Cholecalciferol (CALCIUM 600 + D PO) Take by mouth daily    Historical Provider, MD   Probiotic Product (PROBIOTIC-10 PO) Take by mouth daily    Historical Provider, MD   Ascorbic Acid (VITAMIN C) 250 MG tablet Take 250 mg by mouth daily    Historical Provider, MD   vitamin B-12 (CYANOCOBALAMIN) 1000 MCG tablet Take 1,000 mcg by mouth daily    Historical Provider, MD   Levothyroxine Sodium 50 MCG CAPS Take by mouth Daily    Historical Provider, MD   lisinopril (PRINIVIL;ZESTRIL) 10 MG tablet Take 10 mg by mouth daily    Historical Provider, MD   meloxicam (MOBIC) 15 MG tablet Take 15 mg by mouth daily    Historical Provider, MD   naproxen (NAPROSYN) 250 MG tablet Take 250 mg by mouth as needed for Pain    Historical Provider, MD   acetaminophen (TYLENOL) 500 MG tablet Take 500 mg by mouth every 6 hours as needed for Pain    Historical Provider, MD       Current medications:    Current Facility-Administered Medications   Medication Dose Route Frequency Provider Last Rate Last Admin    0.9 % sodium chloride infusion   IntraVENous Continuous Gale Ibanez  mL/hr at  0910 New Bag at 22 0910    ceFAZolin (ANCEF) 2000 mg in sterile water 20 mL IV syringe  2,000 mg IntraVENous 30 Min Pre-Op Gale Ibanez LPN           Allergies:  No Known Allergies    Problem List:  There is no problem list on file for this patient.       Past Medical History: Diagnosis Date    HTN (hypertension)     Hypothyroid     Papilloma     left breast       Past Surgical History:        Procedure Laterality Date    BACK SURGERY  2011    BREAST SURGERY Left 2005    Lucchese benign    CARDIOVASCULAR STRESS TEST      CARPAL TUNNEL RELEASE Left 1992     SECTION      x 4    COLONOSCOPY  07/10/2017    Dr. Skinny Gayle Left 2015    FOOT SURGERY Left 2017    PEG US GUID NDL BIOPSY LEFT Left 2022    PEG [address] LEFT [facility]22 [facility]    TUBAL LIGATION      US BREAST BIOPSY NEEDLE ADDITIONAL LEFT Left 2022    US BREAST BIOPSY NEEDLE ADDITIONAL LEFT 2022 [facility]       Social History:    Social History     Tobacco Use    Smoking status: Former Smoker     Quit date: 9/3/2011     Years since quitting: 10.5    Smokeless tobacco: Never Used   Substance Use Topics    Alcohol use: Not Currently                                Counseling given: Not Answered      Vital Signs (Current):   Vitals:    22 0833   BP: (!) 110/57   Pulse: 80   Resp: 20   Temp: 98.8 °F (37.1 °C)   TempSrc: Tympanic   SpO2: 96%   Weight: 188 lb 12.8 oz (85.6 kg)   Height: 5' 3\" (1.6 m)                                              BP Readings from Last 3 Encounters:   22 (!) 110/57   22 130/62       NPO Status: Time of last liquid consumption: 620                        Time of last solid consumption:                         Date of last liquid consumption: 22                        Date of last solid food consumption: 22    BMI:   Wt Readings from Last 3 Encounters:   22 188 lb 12.8 oz (85.6 kg)   22 187 lb 8 oz (85 kg)   20 176 lb (79.8 kg)     Body mass index is 33.44 kg/m².     CBC:   Lab Results   Component Value Date    WBC 6.7 2021    RBC 4.53 2021    HGB 14.6 2022    HCT 45.9 2022 MCV 90.9 08/20/2021    RDW 13.0 08/20/2021     08/20/2021       CMP:   Lab Results   Component Value Date     02/18/2022    K 4.3 03/28/2022     02/18/2022    CO2 28 02/18/2022    BUN 19 02/18/2022    CREATININE 0.57 02/18/2022    GLUCOSE 93 02/18/2022    PROT 6.9 02/18/2022    CALCIUM 9.0 02/18/2022    BILITOT 0.6 02/18/2022    ALKPHOS 80 02/18/2022    ALKPHOS 75 08/20/2021    AST 17 02/18/2022    ALT 21 02/18/2022       POC Tests: No results for input(s): POCGLU, POCNA, POCK, POCCL, POCBUN, POCHEMO, POCHCT in the last 72 hours. Coags: No results found for: PROTIME, INR, APTT    HCG (If Applicable): No results found for: PREGTESTUR, PREGSERUM, HCG, HCGQUANT     ABGs: No results found for: PHART, PO2ART, FRE5WQU, JXG5JTX, BEART, D4XMCULD     Type & Screen (If Applicable):  No results found for: LABABO, LABRH    Drug/Infectious Status (If Applicable):  No results found for: HIV, HEPCAB    COVID-19 Screening (If Applicable): No results found for: COVID19        Anesthesia Evaluation  Patient summary reviewed and Nursing notes reviewed no history of anesthetic complications:   Airway: Mallampati: III  TM distance: >3 FB   Neck ROM: full  Mouth opening: > = 3 FB Dental:          Pulmonary:normal exam  breath sounds clear to auscultation                            ROS comment: Former smoker   Cardiovascular:  Exercise tolerance: good (>4 METS),   (+) hypertension:,       ECG reviewed                        Neuro/Psych:   Negative Neuro/Psych ROS              GI/Hepatic/Renal:            ROS comment: obesity. Endo/Other:    (+) hypothyroidism::., .          Pt had no PAT visit       Abdominal:   (+) obese,     Abdomen: soft. Vascular: negative vascular ROS. Other Findings:             Anesthesia Plan      general     ASA 3       Induction: intravenous. MIPS: Postoperative opioids intended and Prophylactic antiemetics administered.   Anesthetic plan and risks discussed with patient and child/children. Plan discussed with CRNA.                   [address] Conchis Miller, DO   3/28/2022

## 2022-03-28 NOTE — PROGRESS NOTES
1201- Pt arrived to PACU alert and without distress. VSS.    1215- Ice pack applied to left breast.     1245- Pt transferred to same day surgery at this time in stable condition. Handoff given to ANTONIO Carr.

## 2022-03-28 NOTE — PROGRESS NOTES
Pt returned to [facility] room 10. Vitals and assessment as charted. 0.9 infusing, @500ml to count from PACU. Pt has crackers and coffee. Family at the bedside. Pt and family verbalized understanding of discharge criteria and call light use. Call light in reach.

## 2022-03-29 ENCOUNTER — TELEPHONE (OUTPATIENT)
Dept: SURGERY | Age: 67
End: 2022-03-29

## 2022-03-29 NOTE — TELEPHONE ENCOUNTER
Called patient to see how she is feeling today after her procedure yesterday with Dr. Trell Linda. Patient states she is doing well and is  pain. She is ambulating, urinating, eating and drinking without difficulty. She is to call the office with any questions or concerns.

## 2022-04-01 ENCOUNTER — TELEPHONE (OUTPATIENT)
Dept: SURGERY | Age: 67
End: 2022-04-01

## 2022-04-01 NOTE — TELEPHONE ENCOUNTER
Pt calling office stating that she has a rash under her left axilla. She is status post left breast lumpectomy 3/28/2022. She states that it itches and burns. Pt advised to use Hydrocortisone cream or Benadryl cream to the area to help with that; informed that it could be from the prep before surgery, or any tape/glue that was used. She voices understanding. She denies any s/sx of infection. Advised to call the office with worsening symptoms.

## 2022-04-14 ENCOUNTER — OFFICE VISIT (OUTPATIENT)
Dept: SURGERY | Age: 67
End: 2022-04-14
Payer: MEDICARE

## 2022-04-14 VITALS
OXYGEN SATURATION: 95 % | SYSTOLIC BLOOD PRESSURE: 128 MMHG | HEART RATE: 60 BPM | BODY MASS INDEX: 33.59 KG/M2 | TEMPERATURE: 96.6 F | WEIGHT: 189.6 LBS | DIASTOLIC BLOOD PRESSURE: 80 MMHG | RESPIRATION RATE: 18 BRPM | HEIGHT: 63 IN

## 2022-04-14 DIAGNOSIS — D24.2 PAPILLOMA OF LEFT BREAST: Primary | ICD-10-CM

## 2022-04-14 DIAGNOSIS — Z09 POSTOP CHECK: ICD-10-CM

## 2022-04-14 DIAGNOSIS — Z98.890 STATUS POST LEFT BREAST LUMPECTOMY: ICD-10-CM

## 2022-04-14 PROCEDURE — 99024 POSTOP FOLLOW-UP VISIT: CPT | Performed by: SURGERY

## 2022-04-14 NOTE — PROGRESS NOTES
[address] [address]  Dept: [phone]  Dept Fax: [phone]  Loc: [phone]    Visit Date: 2022    Stacy Lee is a 79 y.o. female who presentstoday for:  Chief Complaint   Patient presents with    Post-Op Check     s/p 3/28 1. Preoperative needle localization. 2.  Lumpectomy left breast.       HPI:     HPI as above status post lumpectomy with preoperative needle localization. Pathology proved to be all benign intraductal papilloma was removed.   Patient has some minimal discomfort but otherwise no problems postop    Past Medical History:   Diagnosis Date    HTN (hypertension)     Hypothyroid     Papilloma     left breast      Past Surgical History:   Procedure Laterality Date    BACK SURGERY  2011    BREAST LUMPECTOMY Left 3/28/2022    LEFT BREAST LUMPECTOMY WITH PREOP NEEDLE LOC performed by Lidia Thomas MD at [address] Left 2005    [facility] benign    CARDIOVASCULAR STRESS TEST      CARPAL TUNNEL RELEASE Left 1992     SECTION      x 4    COLONOSCOPY  07/10/2017    Dr. Campbell [place] Left 2015    FOOT SURGERY Left 2017    PEG US GUID NDL BIOPSY LEFT Left 2022    PEG [address] LEFT 2022 [facility] Capone [facility]    TUBAL LIGATION      US BREAST BIOPSY NEEDLE ADDITIONAL LEFT Left 2022    US BREAST BIOPSY NEEDLE ADDITIONAL LEFT 2022 Winston Capone [facility]    US GUIDED NEEDLE LOC OF LEFT BREAST Left 3/28/2022    US GUIDED NEEDLE LOC OF LEFT BREAST 3/28/2022 [facility]        Family History   Problem Relation Age of Onset    Breast Cancer Mother 79    Prostate Cancer Father     Hypertension Brother     Stroke Brother     No Known Problems Brother     Stroke Brother     No Known Problems Maternal Grandmother     No Known Problems Maternal Grandfather     No Known Problems Paternal Grandmother     No Known Problems Paternal Grandfather     BRCA 1 Positive Daughter         Social History     Tobacco Use    Smoking status: Former Smoker     Quit date: 9/3/2011     Years since quitting: 10.6    Smokeless tobacco: Never Used   Substance Use Topics    Alcohol use: Not Currently          Current Outpatient Medications   Medication Sig Dispense Refill    ondansetron (ZOFRAN-ODT) 4 MG disintegrating tablet Take 1 tablet by mouth every 8 hours as needed for Nausea or Vomiting 24 tablet 0    Cholecalciferol (VITAMIN D3) 50 MCG (2000 UT) TABS Take by mouth daily      Calcium Carb-Cholecalciferol (CALCIUM 600 + D PO) Take by mouth daily      Probiotic Product (PROBIOTIC-10 PO) Take by mouth daily      Ascorbic Acid (VITAMIN C) 250 MG tablet Take 250 mg by mouth daily      vitamin B-12 (CYANOCOBALAMIN) 1000 MCG tablet Take 1,000 mcg by mouth daily      Levothyroxine Sodium 50 MCG CAPS Take by mouth Daily      lisinopril (PRINIVIL;ZESTRIL) 10 MG tablet Take 10 mg by mouth daily      meloxicam (MOBIC) 15 MG tablet Take 15 mg by mouth daily      naproxen (NAPROSYN) 250 MG tablet Take 250 mg by mouth as needed for Pain      acetaminophen (TYLENOL) 500 MG tablet Take 500 mg by mouth every 6 hours as needed for Pain       No current facility-administered medications for this visit.      No Known Allergies    Subjective:      Review of Systems    Objective:   /80 (Site: Left Upper Arm, Position: Sitting, Cuff Size: Medium Adult)   Pulse 60   Temp 96.6 °F (35.9 °C) (Temporal)   Resp 18   Ht 5' 3\" (1.6 m)   Wt 189 lb 9.6 oz (86 kg)   SpO2 95%   BMI 33.59 kg/m²     Physical Exam wound looks good Steri-Strips removed no signs of any infection       Results for orders placed or performed during the hospital encounter of 03/28/22   Potassium   Result Value Ref Range    Potassium 4.3 3.5 - 5.2 meq/L       Assessment:     Status post lumpectomy of the breast intraductal papilloma was removed pathology was benign. However patient's mother and daughter have both had breast cancer and daughter was BRCA positive. Discussed potential genetic testing for the patient.   Will discuss with nurse navigator and see if she would qualify for genetic testing addendum did talk with Kwesi Iyer nurse navigator at the breast clinic she will contact patient and will consider genetic testing    Plan:     As above      Electronicallysigned by Omi Guillory MD on 4/14/2022 at 11:38 AM

## 2022-04-20 ENCOUNTER — TELEPHONE (OUTPATIENT)
Dept: CASE MANAGEMENT | Age: 67
End: 2022-04-20

## 2022-04-20 NOTE — TELEPHONE ENCOUNTER
Name: Destinee Colon  : 1955  MRN: [id number]    Oncology Navigation- Initial Call:    Notes: Referred from Dr. Racheal Maradiaga for genetic testing, daughter BRCA1 positive. Patient had recent excisional biopsy for papilloma on 3/28/2022, pathology benign. Left  with contact number for patient to call back. Need to assess for needs, verify personal and family history, and complete teaching about GC referral and testing with Jens Mace. Will await return call.     Electronically signed by Janene Robles RN on 2022 at 3:24 PM

## 2022-04-25 ENCOUNTER — TELEPHONE (OUTPATIENT)
Dept: CASE MANAGEMENT | Age: 67
End: 2022-04-25

## 2022-04-27 NOTE — TELEPHONE ENCOUNTER
Name: Mirna Altamirano  : 1955  MRN: [id number]  AGE: 79 y.o. Oncology Navigation- Initial Note:    Intake-  Contact Type: Telephone    Diagnosis: High Risk for Breast Cancer    High Risk Category: First Degree Relative with Known Genetic Mutation, dtr BRCA1 POSITIVE     Home Disposition: Lives with other who is able to assist    Needs and barriers to care: Coordination of Care and Knowledge deficit    Referral Source: Surgeon-Dr. Matthew Lazo    Receptive to Risk Reduction Stategies:   yes - states she talked over with Dr. Matthew Lazo and interested. Breast Imaging-    Date of Last Mammogram: 9/3/2021 last screen, resend left diagnostic for papilloma 2022      Date of Last Breast MRI: does not meet ACS criteria    Personal Risk Factors-    Lifetime Tyrer-Cuzick Score: 15.7 %     Previous History of Cancer: no, but has had at least 4 colon polyps, last 2 on 7/10/2017 hyperplastic, sessiled polyps. Family History of Cancer: Breast Cancer mother, age 79; Prostate Cancer father, age 76. No family history of colon, ovarian, or pancreatic cancer. Previous Genetic Testing: no    Meets Criteria for Genetic Evaluation: no    Oral Contraceptive or Hormone Replacement Therapy: yes - BC x 9 years, last age 32. No hormone replacement per patient. Previous Hysterectomy: no    Menopausal Status: Postmenopausal, age 54     Prior Breast Biopsies: yes -  left benign intramammary node excised by Dr. Antonio Lanier. 3/28/2022 left papilloma-benign excised by Dr. Matthew Lazo. Prior Chemoprevention: no    Additional Risk Factors: yes - 30 year smoking hx, quit . Interventions-   General Interventions: Called back today and wants to get genetic testing as discussed with her surgeon. Genetic assessment completed on phone and family hx updated. Education/Screenings: Genetic counseling and testing-brochure, scheduling instructions, questionnaire.      Referrals: [facility]     Continuum of Care: Initiation of High Risk Pathway    Notes: Teaching completed and verbalizes understanding, Questions addressed. Mailed packet as above with my contact number. Asked patient to notify me when packet received so I can fax [phone] referral form. Will follow.       Electronically signed by Emily Herrera RN on 4/27/2022 at 2:16 PM

## 2022-05-09 ENCOUNTER — TELEPHONE (OUTPATIENT)
Dept: CASE MANAGEMENT | Age: 67
End: 2022-05-09

## 2022-05-09 NOTE — TELEPHONE ENCOUNTER
Name: Ghazal Chow  : 1955  MRN: [id number]    Oncology Navigation Follow-Up Note    Contact Type:  Telephone    Notes: Patient received her genetic packet I mailed. Brought in daughter's genetic result showing BRCA2 mutation. Faxed non-urgent GC referral. Patient aware to watch email to get consult scheduled and have questionnaire completed. Verbalizes understanding.     Electronically signed by Benita Cheung RN on 2022 at 8:32 AM

## 2022-07-11 ENCOUNTER — TELEPHONE (OUTPATIENT)
Dept: SURGERY | Age: 67
End: 2022-07-11

## 2022-07-11 NOTE — TELEPHONE ENCOUNTER
Patient underwent a left lumpectomy w/ preop needle loc on 03- for an intraductal papilloma. Patient is having clear nipple discharge from the left breast again. Patient states that is what happened when they diagnosed her with the intraductal papilloma. Does the patient need to have a mammogram, ultrasound or office visit?   Thank you

## 2022-07-12 NOTE — TELEPHONE ENCOUNTER
Per Dr. Adama Ramirez patient needs an appointment to be evaluated.   Left a message for the patient to schedule an appointment

## 2022-07-19 ENCOUNTER — OFFICE VISIT (OUTPATIENT)
Dept: SURGERY | Age: 67
End: 2022-07-19
Payer: MEDICARE

## 2022-07-19 VITALS
SYSTOLIC BLOOD PRESSURE: 110 MMHG | WEIGHT: 184.8 LBS | HEART RATE: 84 BPM | BODY MASS INDEX: 32.74 KG/M2 | OXYGEN SATURATION: 96 % | TEMPERATURE: 96.5 F | HEIGHT: 63 IN | DIASTOLIC BLOOD PRESSURE: 62 MMHG | RESPIRATION RATE: 18 BRPM

## 2022-07-19 DIAGNOSIS — N64.52 NIPPLE DISCHARGE: Primary | ICD-10-CM

## 2022-07-19 DIAGNOSIS — D24.2 INTRADUCTAL PAPILLOMA OF BREAST, LEFT: ICD-10-CM

## 2022-07-19 PROCEDURE — 99213 OFFICE O/P EST LOW 20 MIN: CPT | Performed by: SURGERY

## 2022-07-19 PROCEDURE — 1123F ACP DISCUSS/DSCN MKR DOCD: CPT | Performed by: SURGERY

## 2022-07-19 RX ORDER — ATORVASTATIN CALCIUM 20 MG/1
20 TABLET, FILM COATED ORAL DAILY
COMMUNITY
Start: 2022-06-20

## 2022-07-19 ASSESSMENT — ENCOUNTER SYMPTOMS
EYE ITCHING: 0
CHEST TIGHTNESS: 0
GASTROINTESTINAL NEGATIVE: 1
EYES NEGATIVE: 1
BACK PAIN: 0
ALLERGIC/IMMUNOLOGIC NEGATIVE: 1
COUGH: 0
SINUS PRESSURE: 0
COLOR CHANGE: 0
RESPIRATORY NEGATIVE: 1
EYE REDNESS: 0
FACIAL SWELLING: 0
CHOKING: 0
EYE PAIN: 0
APNEA: 0
SINUS PAIN: 0
SHORTNESS OF BREATH: 0
EYE DISCHARGE: 0
TROUBLE SWALLOWING: 0
SORE THROAT: 0
VOICE CHANGE: 0
PHOTOPHOBIA: 0
RHINORRHEA: 0
STRIDOR: 0
WHEEZING: 0

## 2022-07-19 NOTE — PROGRESS NOTES
[address] [facility] [address]  Dept: [phone]  Dept Fax: [phone]  Loc: [phone]    Visit Date: 2022    Lorena Tejada is a 79 y.o. female who presentstoday for:  Chief Complaint   Patient presents with    Follow-up     s/p Lumpectomy left breast -3/28/2022 clear nipple discharge-Last seen in the office on 2022       HPI:     HPI as above 80-year-old white female who had undergone a left breast lumpectomy 3 months ago. Patient had an intraductal papilloma that was removed. However over the last several days she has had onset of clear drainage from the left nipple blood denies any trauma. However the drainage has been persistent here for my evaluation.   Should also be noted that patient has a family history of breast cancer but per nurse navigator was not felt to be a candidate for genetic testing    Past Medical History:   Diagnosis Date    Colon polyps     several removed-Dr. Sue Bernal (no path available)    HTN (hypertension)     Hyperlipidemia     Hyperplastic colon polyp 07/10/2017    2 sessiled polyps removed-Dr. Sahni Brought    Hypothyroid     Papilloma     left breast      Past Surgical History:   Procedure Laterality Date    BACK SURGERY  2011    BREAST LUMPECTOMY Left 3/28/2022    LEFT BREAST LUMPECTOMY WITH PREOP NEEDLE LOC performed by Billey Soulier, MD at [facility] [address] Left 2005    Lucchese benign    CARDIOVASCULAR STRESS TEST  2020    CARPAL TUNNEL RELEASE Left 1992     SECTION      x 4    COLONOSCOPY  07/10/2017    Dr. Bruno Watts Left 2015    FOOT SURGERY Left 2017    PEG US GUID NDL BIOPSY LEFT Left 2022    PEG [address] LEFT 2022 MD Beata [address] BREAST BIOPSY NEEDLE ADDITIONAL LEFT Left 2022    US BREAST BIOPSY NEEDLE ADDITIONAL LEFT 2022 Aydee Abbott MD [address] GUIDED NEEDLE LOC OF LEFT BREAST Left 3/28/2022    US GUIDED NEEDLE LOC OF LEFT BREAST 3/28/2022 [facility] Vaughankika Maldonado [place] [address]        Family History   Problem Relation Age of Onset    Breast Cancer Mother 79    Prostate Cancer Father 76    Hypertension Brother     Stroke Brother     No Known Problems Brother     Stroke Brother     No Known Problems Maternal Grandmother     No Known Problems Maternal Grandfather     No Known Problems Paternal Grandmother     No Known Problems Paternal Grandfather     BRCA 1 Positive Daughter         Social History     Tobacco Use    Smoking status: Former     Types: Cigarettes     Quit date: 9/3/2011     Years since quitting: 10.8    Smokeless tobacco: Never   Substance Use Topics    Alcohol use: Not Currently          Current Outpatient Medications   Medication Sig Dispense Refill    atorvastatin (LIPITOR) 20 MG tablet Take 20 mg by mouth in the morning. ondansetron (ZOFRAN-ODT) 4 MG disintegrating tablet Take 1 tablet by mouth every 8 hours as needed for Nausea or Vomiting 24 tablet 0    Cholecalciferol (VITAMIN D3) 50 MCG (2000 UT) TABS Take by mouth daily      Calcium Carb-Cholecalciferol (CALCIUM 600 + D PO) Take by mouth daily      Probiotic Product (PROBIOTIC-10 PO) Take by mouth daily      Ascorbic Acid (VITAMIN C) 250 MG tablet Take 250 mg by mouth daily      vitamin B-12 (CYANOCOBALAMIN) 1000 MCG tablet Take 1,000 mcg by mouth daily      Levothyroxine Sodium 50 MCG CAPS Take by mouth Daily      lisinopril (PRINIVIL;ZESTRIL) 10 MG tablet Take 10 mg by mouth daily      meloxicam (MOBIC) 15 MG tablet Take 15 mg by mouth daily      naproxen (NAPROSYN) 250 MG tablet Take 250 mg by mouth as needed for Pain      acetaminophen (TYLENOL) 500 MG tablet Take 500 mg by mouth every 6 hours as needed for Pain       No current facility-administered medications for this visit.      No Known Allergies    Subjective:      Review of Systems Constitutional: Negative. Negative for activity change, appetite change, chills, diaphoresis, fatigue, fever and unexpected weight change. HENT: Negative. Negative for congestion, dental problem, drooling, ear discharge, ear pain, facial swelling, hearing loss, mouth sores, nosebleeds, postnasal drip, rhinorrhea, sinus pressure, sinus pain, sneezing, sore throat, tinnitus, trouble swallowing and voice change. Eyes: Negative. Negative for photophobia, pain, discharge, redness, itching and visual disturbance. Respiratory: Negative. Negative for apnea, cough, choking, chest tightness, shortness of breath, wheezing and stridor. Cardiovascular: Negative. Negative for chest pain, palpitations and leg swelling. Gastrointestinal: Negative. Endocrine: Negative. Negative for cold intolerance, heat intolerance, polydipsia, polyphagia and polyuria. Genitourinary: Negative. Negative for decreased urine volume, difficulty urinating, dyspareunia, dysuria, enuresis, flank pain, frequency, genital sores, hematuria, menstrual problem, pelvic pain, urgency, vaginal bleeding, vaginal discharge and vaginal pain. Musculoskeletal: Negative. Negative for arthralgias, back pain, gait problem, joint swelling, myalgias, neck pain and neck stiffness. Skin: Negative. Negative for color change, pallor, rash and wound. Allergic/Immunologic: Negative. Negative for environmental allergies, food allergies and immunocompromised state. Neurological: Negative. Negative for dizziness, tremors, seizures, syncope, facial asymmetry, speech difficulty, weakness, light-headedness, numbness and headaches. Hematological: Negative. Negative for adenopathy. Does not bruise/bleed easily. Psychiatric/Behavioral: Negative. Negative for agitation, behavioral problems, confusion, decreased concentration, dysphoric mood, hallucinations, self-injury, sleep disturbance and suicidal ideas.  The patient is not nervous/anxious and

## 2022-07-25 ENCOUNTER — HOSPITAL ENCOUNTER (OUTPATIENT)
Dept: WOMENS IMAGING | Age: 67
Discharge: HOME | End: 2022-07-25
Payer: MEDICARE

## 2022-07-25 DIAGNOSIS — N64.52 NIPPLE DISCHARGE: ICD-10-CM

## 2022-07-25 DIAGNOSIS — D24.2 INTRADUCTAL PAPILLOMA OF BREAST, LEFT: ICD-10-CM

## 2022-07-25 PROCEDURE — 77066 DX MAMMO INCL CAD BI: CPT

## 2022-07-25 PROCEDURE — 76642 ULTRASOUND BREAST LIMITED: CPT

## 2022-08-01 ENCOUNTER — CLINICAL DOCUMENTATION (OUTPATIENT)
Dept: CASE MANAGEMENT | Age: 67
End: 2022-08-01

## 2022-08-02 NOTE — PROGRESS NOTES
Name: Andra Mcneill  : 1955  MRN: [id number]    Oncology Navigation Follow-Up Note    Contact Type:  Telephone    Notes: Had genetic counseling 2022 with Carole Muhammad. No genetic testing recommended per note. Daughter's BRCA2 mutation felt to be from her paternal side (see in media for additional details). Based on this evaluation from genetic counselor, patient does NOT meet criteria for  per protocol. Should continue general mammogram screenings as previously recommended by mammography department.     Electronically signed by Katharine Tabor RN on 2022 at 2:46 PM

## 2022-10-07 ENCOUNTER — HOSPITAL ENCOUNTER (OUTPATIENT)
Dept: WOMENS IMAGING | Age: 67
Discharge: HOME | End: 2022-10-07
Payer: MEDICARE

## 2022-10-07 DIAGNOSIS — M81.8 OTHER OSTEOPOROSIS WITHOUT CURRENT PATHOLOGICAL FRACTURE: ICD-10-CM

## 2022-10-07 PROCEDURE — 77080 DXA BONE DENSITY AXIAL: CPT

## 2023-02-24 ENCOUNTER — HOSPITAL ENCOUNTER (OUTPATIENT)
Dept: WOMENS IMAGING | Age: 68
Discharge: HOME | End: 2023-02-24
Payer: MEDICARE

## 2023-02-24 ENCOUNTER — APPOINTMENT (OUTPATIENT)
Dept: WOMENS IMAGING | Age: 68
End: 2023-02-24
Payer: MEDICARE

## 2023-02-24 DIAGNOSIS — R92.2 BREAST DENSITY: ICD-10-CM

## 2023-02-24 DIAGNOSIS — N64.52 DISCHARGE FROM NIPPLE: ICD-10-CM

## 2023-02-24 DIAGNOSIS — Z09 FOLLOW-UP EXAM: ICD-10-CM

## 2023-02-24 PROCEDURE — 76642 ULTRASOUND BREAST LIMITED: CPT

## 2023-03-24 ENCOUNTER — HOSPITAL ENCOUNTER (OUTPATIENT)
Dept: MRI IMAGING | Age: 68
Discharge: HOME | End: 2023-03-24
Payer: MEDICARE

## 2023-03-24 DIAGNOSIS — N64.52 NIPPLE DISCHARGE: ICD-10-CM

## 2023-03-24 PROCEDURE — 6360000004 HC RX CONTRAST MEDICATION: Performed by: FAMILY MEDICINE

## 2023-03-24 PROCEDURE — A9579 GAD-BASE MR CONTRAST NOS,1ML: HCPCS | Performed by: FAMILY MEDICINE

## 2023-03-24 PROCEDURE — C8908 MRI W/O FOL W/CONT, BREAST,: HCPCS

## 2023-03-24 RX ADMIN — GADOTERIDOL 15 ML: 279.3 INJECTION, SOLUTION INTRAVENOUS at 17:03

## 2023-04-14 RX ORDER — OMEPRAZOLE 40 MG/1
40 CAPSULE, DELAYED RELEASE ORAL DAILY
COMMUNITY

## 2023-04-14 RX ORDER — LISINOPRIL 5 MG/1
5 TABLET ORAL DAILY
COMMUNITY

## 2023-04-24 ENCOUNTER — HOSPITAL ENCOUNTER (OUTPATIENT)
Dept: WOMENS IMAGING | Age: 68
Discharge: HOME | End: 2023-04-24
Payer: MEDICARE

## 2023-04-24 ENCOUNTER — ANESTHESIA (OUTPATIENT)
Dept: OPERATING ROOM | Age: 68
End: 2023-04-24
Payer: MEDICARE

## 2023-04-24 ENCOUNTER — ANESTHESIA EVENT (OUTPATIENT)
Dept: OPERATING ROOM | Age: 68
End: 2023-04-24
Payer: MEDICARE

## 2023-04-24 ENCOUNTER — HOSPITAL ENCOUNTER (OUTPATIENT)
Age: 68
Setting detail: OUTPATIENT SURGERY
Discharge: HOME | End: 2023-04-24
Attending: SURGERY | Admitting: SURGERY
Payer: MEDICARE

## 2023-04-24 VITALS
TEMPERATURE: 97.1 F | BODY MASS INDEX: 33.81 KG/M2 | HEART RATE: 83 BPM | HEIGHT: 63 IN | OXYGEN SATURATION: 93 % | RESPIRATION RATE: 16 BRPM | SYSTOLIC BLOOD PRESSURE: 117 MMHG | WEIGHT: 190.8 LBS | DIASTOLIC BLOOD PRESSURE: 59 MMHG

## 2023-04-24 DIAGNOSIS — N63.20 MASS OF LEFT BREAST, UNSPECIFIED QUADRANT: Primary | ICD-10-CM

## 2023-04-24 DIAGNOSIS — N63.25 MASS OVERLAPPING MULTIPLE QUADRANTS OF LEFT BREAST: ICD-10-CM

## 2023-04-24 PROCEDURE — 88307 TISSUE EXAM BY PATHOLOGIST: CPT

## 2023-04-24 PROCEDURE — 6360000002 HC RX W HCPCS: Performed by: NURSE ANESTHETIST, CERTIFIED REGISTERED

## 2023-04-24 PROCEDURE — 3700000001 HC ADD 15 MINUTES (ANESTHESIA): Performed by: SURGERY

## 2023-04-24 PROCEDURE — 7100000001 HC PACU RECOVERY - ADDTL 15 MIN: Performed by: SURGERY

## 2023-04-24 PROCEDURE — 6360000002 HC RX W HCPCS: Performed by: SURGERY

## 2023-04-24 PROCEDURE — 3600000002 HC SURGERY LEVEL 2 BASE: Performed by: SURGERY

## 2023-04-24 PROCEDURE — 2709999900 HC NON-CHARGEABLE SUPPLY: Performed by: SURGERY

## 2023-04-24 PROCEDURE — 3600000012 HC SURGERY LEVEL 2 ADDTL 15MIN: Performed by: SURGERY

## 2023-04-24 PROCEDURE — 76098 X-RAY EXAM SURGICAL SPECIMEN: CPT

## 2023-04-24 PROCEDURE — 7100000010 HC PHASE II RECOVERY - FIRST 15 MIN: Performed by: SURGERY

## 2023-04-24 PROCEDURE — G0279 TOMOSYNTHESIS, MAMMO: HCPCS

## 2023-04-24 PROCEDURE — 3700000000 HC ANESTHESIA ATTENDED CARE: Performed by: SURGERY

## 2023-04-24 PROCEDURE — 7100000000 HC PACU RECOVERY - FIRST 15 MIN: Performed by: SURGERY

## 2023-04-24 PROCEDURE — 6370000000 HC RX 637 (ALT 250 FOR IP): Performed by: SURGERY

## 2023-04-24 PROCEDURE — 7100000011 HC PHASE II RECOVERY - ADDTL 15 MIN: Performed by: SURGERY

## 2023-04-24 PROCEDURE — 2500000003 HC RX 250 WO HCPCS: Performed by: NURSE ANESTHETIST, CERTIFIED REGISTERED

## 2023-04-24 PROCEDURE — 2580000003 HC RX 258: Performed by: SURGERY

## 2023-04-24 RX ORDER — FENTANYL CITRATE 50 UG/ML
50 INJECTION, SOLUTION INTRAMUSCULAR; INTRAVENOUS EVERY 5 MIN PRN
OUTPATIENT
Start: 2023-04-24

## 2023-04-24 RX ORDER — ONDANSETRON 2 MG/ML
INJECTION INTRAMUSCULAR; INTRAVENOUS PRN
Status: DISCONTINUED | OUTPATIENT
Start: 2023-04-24 | End: 2023-04-24 | Stop reason: SDUPTHER

## 2023-04-24 RX ORDER — SODIUM CHLORIDE 9 MG/ML
INJECTION, SOLUTION INTRAVENOUS PRN
OUTPATIENT
Start: 2023-04-24

## 2023-04-24 RX ORDER — SODIUM CHLORIDE 0.9 % (FLUSH) 0.9 %
5-40 SYRINGE (ML) INJECTION EVERY 12 HOURS SCHEDULED
OUTPATIENT
Start: 2023-04-24

## 2023-04-24 RX ORDER — PROPOFOL 10 MG/ML
INJECTION, EMULSION INTRAVENOUS PRN
Status: DISCONTINUED | OUTPATIENT
Start: 2023-04-24 | End: 2023-04-24 | Stop reason: SDUPTHER

## 2023-04-24 RX ORDER — DEXAMETHASONE SODIUM PHOSPHATE 10 MG/ML
INJECTION, EMULSION INTRAMUSCULAR; INTRAVENOUS PRN
Status: DISCONTINUED | OUTPATIENT
Start: 2023-04-24 | End: 2023-04-24 | Stop reason: SDUPTHER

## 2023-04-24 RX ORDER — SODIUM CHLORIDE 9 MG/ML
INJECTION, SOLUTION INTRAVENOUS CONTINUOUS
Status: DISCONTINUED | OUTPATIENT
Start: 2023-04-24 | End: 2023-04-24 | Stop reason: HOSPADM

## 2023-04-24 RX ORDER — OXYCODONE HYDROCHLORIDE AND ACETAMINOPHEN 5; 325 MG/1; MG/1
1 TABLET ORAL EVERY 6 HOURS PRN
Qty: 10 TABLET | Refills: 0 | Status: SHIPPED | OUTPATIENT
Start: 2023-04-24 | End: 2023-04-27

## 2023-04-24 RX ORDER — FENTANYL CITRATE 50 UG/ML
INJECTION, SOLUTION INTRAMUSCULAR; INTRAVENOUS PRN
Status: DISCONTINUED | OUTPATIENT
Start: 2023-04-24 | End: 2023-04-24 | Stop reason: SDUPTHER

## 2023-04-24 RX ORDER — GLYCOPYRROLATE 0.2 MG/ML
INJECTION INTRAMUSCULAR; INTRAVENOUS PRN
Status: DISCONTINUED | OUTPATIENT
Start: 2023-04-24 | End: 2023-04-24 | Stop reason: SDUPTHER

## 2023-04-24 RX ORDER — OXYCODONE HYDROCHLORIDE AND ACETAMINOPHEN 5; 325 MG/1; MG/1
1 TABLET ORAL ONCE
Status: COMPLETED | OUTPATIENT
Start: 2023-04-24 | End: 2023-04-24

## 2023-04-24 RX ORDER — MORPHINE SULFATE 2 MG/ML
2 INJECTION, SOLUTION INTRAMUSCULAR; INTRAVENOUS EVERY 5 MIN PRN
OUTPATIENT
Start: 2023-04-24

## 2023-04-24 RX ORDER — LABETALOL HYDROCHLORIDE 5 MG/ML
10 INJECTION, SOLUTION INTRAVENOUS
OUTPATIENT
Start: 2023-04-24

## 2023-04-24 RX ORDER — SODIUM CHLORIDE 0.9 % (FLUSH) 0.9 %
5-40 SYRINGE (ML) INJECTION PRN
OUTPATIENT
Start: 2023-04-24

## 2023-04-24 RX ORDER — MIDAZOLAM HYDROCHLORIDE 1 MG/ML
INJECTION INTRAMUSCULAR; INTRAVENOUS PRN
Status: DISCONTINUED | OUTPATIENT
Start: 2023-04-24 | End: 2023-04-24 | Stop reason: SDUPTHER

## 2023-04-24 RX ADMIN — Medication 80 MG: at 12:13

## 2023-04-24 RX ADMIN — SODIUM CHLORIDE: 9 INJECTION, SOLUTION INTRAVENOUS at 09:38

## 2023-04-24 RX ADMIN — PHENYLEPHRINE HYDROCHLORIDE 200 MCG: 10 INJECTION INTRAVENOUS at 12:19

## 2023-04-24 RX ADMIN — Medication 2000 MG: at 12:20

## 2023-04-24 RX ADMIN — OXYCODONE AND ACETAMINOPHEN 1 TABLET: 5; 325 TABLET ORAL at 14:13

## 2023-04-24 RX ADMIN — PROPOFOL 200 MG: 10 INJECTION, EMULSION INTRAVENOUS at 12:14

## 2023-04-24 RX ADMIN — FENTANYL CITRATE 100 MCG: 50 INJECTION INTRAMUSCULAR; INTRAVENOUS at 12:11

## 2023-04-24 RX ADMIN — FENTANYL CITRATE 50 MCG: 50 INJECTION INTRAMUSCULAR; INTRAVENOUS at 13:16

## 2023-04-24 RX ADMIN — GLYCOPYRROLATE 0.2 MG: 0.2 INJECTION INTRAMUSCULAR; INTRAVENOUS at 12:23

## 2023-04-24 RX ADMIN — MIDAZOLAM 2 MG: 1 INJECTION INTRAMUSCULAR; INTRAVENOUS at 12:10

## 2023-04-24 RX ADMIN — DEXAMETHASONE SODIUM PHOSPHATE 10 MG: 10 INJECTION, EMULSION INTRAMUSCULAR; INTRAVENOUS at 12:14

## 2023-04-24 RX ADMIN — ONDANSETRON 4 MG: 2 INJECTION INTRAMUSCULAR; INTRAVENOUS at 13:00

## 2023-04-24 RX ADMIN — PHENYLEPHRINE HYDROCHLORIDE 200 MCG: 10 INJECTION INTRAVENOUS at 12:29

## 2023-04-24 ASSESSMENT — PAIN SCALES - GENERAL
PAINLEVEL_OUTOF10: 8
PAINLEVEL_OUTOF10: 4
PAINLEVEL_OUTOF10: 2
PAINLEVEL_OUTOF10: 0
PAINLEVEL_OUTOF10: 4

## 2023-04-24 ASSESSMENT — PAIN DESCRIPTION - LOCATION
LOCATION: BREAST
LOCATION: BREAST

## 2023-04-24 ASSESSMENT — PAIN DESCRIPTION - ORIENTATION
ORIENTATION: LEFT
ORIENTATION: LEFT

## 2023-04-24 ASSESSMENT — PAIN DESCRIPTION - DESCRIPTORS
DESCRIPTORS: ACHING
DESCRIPTORS: ACHING

## 2023-04-24 ASSESSMENT — PAIN DESCRIPTION - PAIN TYPE: TYPE: SURGICAL PAIN

## 2023-04-24 NOTE — ANESTHESIA PRE PROCEDURE
Department of Anesthesiology  Preprocedure Note       Name:  Sav Saavedra   Age:  76 y.o.  :  1955                                          MRN:  [id number]         Date:  2023      Surgeon: Suhas Capellan):  Eda Tena MD    Procedure: Procedure(s):  EXCISION LEFT BREAST MASS WITH PRIOR NEEDLE LOC    Medications prior to admission:   Prior to Admission medications    Medication Sig Start Date End Date Taking?  Authorizing Provider   lisinopril (PRINIVIL;ZESTRIL) 5 MG tablet Take 1 tablet by mouth daily   Yes Historical Provider, MD   omeprazole (PRILOSEC) 40 MG delayed release capsule Take 1 capsule by mouth daily   Yes Historical Provider, MD   ALBUTEROL IN Inhale into the lungs as needed   Yes Historical Provider, MD   atorvastatin (LIPITOR) 20 MG tablet Take 1 tablet by mouth daily 22   Historical Provider, MD   ondansetron (ZOFRAN-ODT) 4 MG disintegrating tablet Take 1 tablet by mouth every 8 hours as needed for Nausea or Vomiting 3/28/22   Eda Tena MD   Cholecalciferol (VITAMIN D3) 50 MCG (2000 UT) TABS Take by mouth daily    Historical Provider, MD   Calcium Carb-Cholecalciferol (CALCIUM 600 + D PO) Take by mouth daily    Historical Provider, MD   Probiotic Product (PROBIOTIC-10 PO) Take by mouth daily    Historical Provider, MD   vitamin B-12 (CYANOCOBALAMIN) 1000 MCG tablet Take 1 tablet by mouth daily    Historical Provider, MD   Levothyroxine Sodium 50 MCG CAPS Take by mouth Daily    Historical Provider, MD   acetaminophen (TYLENOL) 500 MG tablet Take 1 tablet by mouth every 6 hours as needed for Pain    Historical Provider, MD       Current medications:    Current Facility-Administered Medications   Medication Dose Route Frequency Provider Last Rate Last Admin    ceFAZolin (ANCEF) 2000 mg in 0.9% sodium chloride 50 mL IVPB  2,000 mg IntraVENous 60 Min Pre-Op Eda Tena MD        0.9 % sodium chloride infusion   IntraVENous Continuous Eda Tena

## 2023-04-24 NOTE — DISCHARGE INSTRUCTIONS
drain daily and record the amounts. BREAST PROCEDURES  [x]Following a breast procedure, it is important to continue to where supportive garments. []Following a sentinal lymph node biopsy, you should not be alarmed if your urine has a blue color to it. This is your body eliminating the dye used for the procedure. ABDOMINAL/LAPAROSCOPIC SURGERY  [x]You are encouraged to get up and move around as this helps with the circulation and speeds up the healing process. [x]Breath deeply and cough from time to time. This helps to clear your lungs and helps prevent pneumonia. []Supporting your incision with a pillow or your hand helps to minimize discomfort and pain. []Laparoscopic patients may develop shoulder pain in the first 48 hours from the gas used during the procedure. FOLLOW-UP CARE. SPECIFICALLY WATCH FOR:   Fever over 101 degrees by mouth   Increased redness, warmth, hardness at operative site. Blood soaked dressing (small amounts of oozing may be normal.)   Increased or progressive drainage from the surgical area   Inability to urinate or blood in the urine   Pain not relieved by the medications ordered   Persistent nausea and/or vomiting, unable to retain fluids. FOLLOW-UP APPOINTMENT   []1 week   [x]2 weeks   []Other    Call my office if you have any problem that concerns you . After hours, you can reach the answering service via the office phone number. IF YOU NEED IMMEDIATE ATTENTION, GO TO THE EMERGENCY ROOM AND YOUR DOCTOR WILL BE CONTACTED.

## 2023-04-24 NOTE — PROGRESS NOTES
REturned from recovery room. Awake. Left breast dressing dry and intact. Snack given. Family in the room.

## 2023-04-24 NOTE — PROGRESS NOTES

## 2023-04-24 NOTE — H&P
[address] [id number]                              HISTORY AND PHYSICAL    PATIENT NAME: Shagufta Lopez                    :        1955  MED REC NO:   [id number]                           ROOM:  ACCOUNT NO:   [de-identified]                           ADMIT DATE: 2023  PROVIDER:     Sandra Hensley. Aniyah Bradford MD    DATE OF ADMISSION:  2023    CHIEF COMPLAINT:  Persistent nodule, left breast.    HISTORY OF PRESENT ILLNESS:  The patient is a 28-year-old female whose  mother did have breast cancer. Her daughter is BRCA positive, who one  year ago in 2022 underwent a lumpectomy of the left breast what  proved to be an intraductal papilloma. She had other nodules seen in  the breast, but basically has been having persisting clear nipple  discharge on the left side, no blood. Because of this, she underwent  further workup, had MRIs, and has an enhancing lesion in the left breast  7 mm that I believe had been biopsied in the past; however, it enhanced  increasingly, and it is the recommendation of the radiology that this  also should be removed, and she is being admitted at this time for  needle localization and lumpectomy. It should be noted that I did  discuss with the patient that I was not convinced that this would be  causing her nipple discharge. Nonetheless, she is being admitted for  same. PAST MEDICAL HISTORY:  Positive for hypertension, hypothyroidism. PAST SURGICAL HISTORY:  1.  section x4.  2.  She has had a carpal tunnel release. 3.  She has had lumbar disk surgery. She has had the previous  lumpectomy of the left breast.  She has had orthopedic procedures on her  left foot, and she has had colonoscopies. FAMILY HISTORY:  Positive for breast cancer, prostate cancer,  hypertension. SOCIAL HISTORY:  The patient has a history of smoking, quit 10-1/2 years  ago. MEDICATIONS:  None.     REVIEW

## 2023-04-24 NOTE — ANESTHESIA POSTPROCEDURE EVALUATION
Department of Anesthesiology  Postprocedure Note    Patient: Ya Valera  MRN: [id number]  YOB: 1955  Date of evaluation: 4/24/2023      Procedure Summary     Date: 04/24/23 [facility] [address] [facility]    Anesthesia Start: 1207 Anesthesia Stop: 2040    Procedure: EXCISION LEFT BREAST MASS WITH PRIOR NEEDLE LOC (Left: Breast) Diagnosis:       Mass of left breast, unspecified quadrant      (Mass of left breast, unspecified quadrant [N63.20])    Surgeons: Selma Chatman MD Responsible Provider: Diamante Mace DO    Anesthesia Type: general ASA Status: 3          Anesthesia Type: No value filed.     Reymundo Phase I: Reymundo Score: 10    Reymundo Phase II:        Anesthesia Post Evaluation    Patient location during evaluation: PACU  Patient participation: complete - patient participated  Level of consciousness: awake and alert  Airway patency: patent  Nausea & Vomiting: no vomiting and no nausea  Complications: no  Cardiovascular status: hemodynamically stable  Respiratory status: acceptable  Hydration status: stable

## 2023-04-24 NOTE — PROGRESS NOTES
1317- pt to pacu, reports pain, medicated per anesthesia. VSS, resp easy, unlabored. Pt appears in no acute distress. 1324- pt tolerates ice chips, follows command for deep breathing exercises. Pt reports pain tolerable at this time. 1333- pt resting in bed eyes closed. Resp easy, unlabored.  Vss.    1347- pt meets criteria for discharge form pacu,     1351- returned to [facility], report given to [facility]

## 2023-04-24 NOTE — BRIEF OP NOTE
Brief Postoperative Note      Patient: Naresh Baird  YOB: 1955  MRN: [id number]    Date of Procedure: 4/24/2023    Pre-Op Diagnosis Codes:     * Mass of left breast, unspecified quadrant [N63.20]    Post-Op Diagnosis: same       Procedure(s):  EXCISION LEFT BREAST MASS WITH PRIOR NEEDLE LOC    Surgeon(s):  Juan Felder MD    Assistant:  Resident:  Elias Saldaña DPM    Anesthesia: General    Estimated Blood Loss (mL): 5 ml    Complications: none    Specimens:   ID Type Source Tests Collected by Time Destination   A : left breast mass   out at 1238 Tissue Breast [address] [place] [address] Avenue, MD 4/24/2023 1238        Implants:        Drains: none    Findings: see op note  This procedure was not performed to treat primary cutaneous melanoma through wide local excision      Electronically signed by Juan Felder MD on 4/24/2023 at 1:14 PM

## 2023-04-24 NOTE — PROGRESS NOTES
Contact Type: [facility]    Contact Information: Arrived with her daughter for needle localization. Having breast surgery today with Dr. Sharon Lewis. Diagnosis: nipple discharge, abnormal US / MRI     Notes: Procedure explained and questions addressed as needed. Dr. Shanel Hutchinson placed wire. Secured with gauze and tape per protocol. Transported patient with belongings to Same Day Surgery via wheelchair at 0830.

## 2023-04-24 NOTE — PROGRESS NOTES
Admitted to Same Day Surgery and oriented to the unit. Patient verbalized approval for first name, last initial and physician name on the unit white board. Fall band on patient. Daughter at bedside.

## 2023-04-24 NOTE — H&P
[address] [address]  History and Physical Update      Pt Name: Ish Osei  MRN: [id number]  YOB: 1955  Date of evaluation: 4/24/2023    [x] I have examined the patient and reviewed the H&P/Consult and there are no changes to the patient or plans. [] I have examined the patient and reviewed the H&P/Consult and have noted the following changes:         Guy Clinton MD  Electronically signed 4/24/2023 at 10:27 AM

## 2023-04-25 NOTE — OP NOTE
[address] [place] [address]                                OPERATIVE REPORT    PATIENT NAME: Saroj Perez                    :        1955  MED REC NO:   [id number]                           ROOM:  ACCOUNT NO:   [de-identified]                           ADMIT DATE: 2023  PROVIDER:     Nohemi Taylor MD    DATE OF PROCEDURE:  2023    PREOPERATIVE DIAGNOSIS:  Mass of the left breast.    POSTOPERATIVE DIAGNOSIS:  Mass of the left breast.    OPERATIONS:  1. Preoperative needle localization. 2.  Lumpectomy, left breast.    SURGEON:  Nohemi Sky MD    ANESTHESIA:  General.    COMPLICATIONS:  None. ESTIMATED BLOOD LOSS:  10 mL. INDICATIONS FOR PROCEDURE:  The patient is a 70-year-old white female  who has an enlarging mass enhancing on MRI of the left breast.  She has  a family history of breast cancer. It was felt it should be excised. FINDINGS:  The clip and mass were all in the specimen removed. PROCEDURE:  The patient was brought into the hospital and taken to x-ray  where she underwent a needle localization. She was then brought to  surgery, and after adequate inhalational anesthesia was administered,  the patient's left breast was prepped and draped in usual sterile  fashion. Incision was made over the entrance of the guidewire. The  guidewire  from the skin and then we followed the needle path,  taking a core of breast tissue down to the tip of the needle, doing a  lumpectomy. Hemostasis was obtained with electrocautery. Word came  back from the [facility] that the clip and mass were in the  specimen given and then interrupted 3-0 Vicryl was used to close the  subcutaneous and a running 4-0 Vicryl suture was used to close the skin. Steri-Strips were applied. CAMILLA BEE [facility], MD    D: 2023 7:44:51       T: 2023 7:49:54     [email or web address]  Job#: [id number]

## 2023-05-09 ENCOUNTER — HOSPITAL ENCOUNTER (OUTPATIENT)
Dept: CT IMAGING | Age: 68
Discharge: HOME | End: 2023-05-09
Payer: MEDICARE

## 2023-05-09 DIAGNOSIS — J98.4 LUNG DENSITY ON X-RAY: ICD-10-CM

## 2023-05-09 DIAGNOSIS — R91.1 PULMONARY NODULE 1 CM OR GREATER IN DIAMETER: ICD-10-CM

## 2023-05-09 DIAGNOSIS — R91.1 LUNG NODULE: ICD-10-CM

## 2023-05-09 PROCEDURE — 71260 CT THORAX DX C+: CPT

## 2023-05-09 PROCEDURE — 6360000004 HC RX CONTRAST MEDICATION: Performed by: SURGERY

## 2023-05-09 RX ADMIN — IOPAMIDOL 80 ML: 755 INJECTION, SOLUTION INTRAVENOUS at 14:48

## 2023-05-19 ENCOUNTER — OFFICE VISIT (OUTPATIENT)
Dept: PULMONOLOGY | Age: 68
End: 2023-05-19
Payer: MEDICARE

## 2023-05-19 VITALS
OXYGEN SATURATION: 97 % | TEMPERATURE: 97.7 F | WEIGHT: 191.6 LBS | HEIGHT: 64 IN | HEART RATE: 96 BPM | BODY MASS INDEX: 32.71 KG/M2 | SYSTOLIC BLOOD PRESSURE: 126 MMHG | DIASTOLIC BLOOD PRESSURE: 76 MMHG

## 2023-05-19 DIAGNOSIS — Z57.9 OCCUPATIONAL EXPOSURE IN WORKPLACE: ICD-10-CM

## 2023-05-19 DIAGNOSIS — R91.8 MULTIPLE LUNG NODULES ON CT: Primary | ICD-10-CM

## 2023-05-19 DIAGNOSIS — T78.40XA ALLERGY, INITIAL ENCOUNTER: ICD-10-CM

## 2023-05-19 DIAGNOSIS — R06.09 DOE (DYSPNEA ON EXERTION): ICD-10-CM

## 2023-05-19 DIAGNOSIS — D24.2 PAPILLOMA OF LEFT BREAST: ICD-10-CM

## 2023-05-19 DIAGNOSIS — Z87.891 EX-SMOKER: ICD-10-CM

## 2023-05-19 DIAGNOSIS — E03.9 HYPOTHYROIDISM, UNSPECIFIED TYPE: ICD-10-CM

## 2023-05-19 DIAGNOSIS — I10 PRIMARY HYPERTENSION: ICD-10-CM

## 2023-05-19 DIAGNOSIS — E66.9 OBESITY (BMI 30-39.9): ICD-10-CM

## 2023-05-19 PROCEDURE — 99204 OFFICE O/P NEW MOD 45 MIN: CPT | Performed by: SPECIALIST

## 2023-05-19 PROCEDURE — 3078F DIAST BP <80 MM HG: CPT | Performed by: SPECIALIST

## 2023-05-19 PROCEDURE — 1123F ACP DISCUSS/DSCN MKR DOCD: CPT | Performed by: SPECIALIST

## 2023-05-19 PROCEDURE — 3074F SYST BP LT 130 MM HG: CPT | Performed by: SPECIALIST

## 2023-05-19 SDOH — HEALTH STABILITY - PHYSICAL HEALTH: OCCUPATIONAL EXPOSURE TO UNSPECIFIED RISK FACTOR: Z57.9

## 2023-05-19 NOTE — PROGRESS NOTES
Take 1 tablet by mouth daily      ondansetron (ZOFRAN-ODT) 4 MG disintegrating tablet Take 1 tablet by mouth every 8 hours as needed for Nausea or Vomiting 24 tablet 0    Cholecalciferol (VITAMIN D3) 50 MCG (2000 UT) TABS Take by mouth daily      Calcium Carb-Cholecalciferol (CALCIUM 600 + D PO) Take by mouth daily      Probiotic Product (PROBIOTIC-10 PO) Take by mouth daily      vitamin B-12 (CYANOCOBALAMIN) 1000 MCG tablet Take 1 tablet by mouth daily      Levothyroxine Sodium 50 MCG CAPS Take by mouth Daily      acetaminophen (TYLENOL) 500 MG tablet Take 1 tablet by mouth every 6 hours as needed for Pain       No current facility-administered medications for this visit. Ross LLANES   General/Constitutional: No history of trauma  HENT: Negative. Eyes: Negative. Upper respiratory tract: No nasal stuffiness or post nasal drip. Lower respiratory tract/ lungs: No cough or sputum production. No hemoptysis. Cardiovascular: No palpitations or chest pain. Gastrointestinal: No nausea or vomiting. Neurological: No focal neurologiacal weakness. Extremities: No edema. Musculoskeletal: No complaints. Genitourinary: No complaints. Hematological: Negative. Psychiatric/Behavioral: Negative. Skin: No itching. Physical exam   /76 (Site: Left Upper Arm, Position: Sitting, Cuff Size: Medium Adult)   Pulse 96   Temp 97.7 °F (36.5 °C) (Infrared)   Ht 5' 3.75\" (1.619 m)   Wt 191 lb 9.6 oz (86.9 kg)   SpO2 97%   BMI 33.15 kg/m²    Wt Readings from Last 3 Encounters:   05/19/23 191 lb 9.6 oz (86.9 kg)   04/24/23 190 lb 12.8 oz (86.5 kg)   07/19/22 184 lb 12.8 oz (83.8 kg)     Constitutional: Moderately built and moderately nourished. No distress. HENT:   Head: Normocephalic and atraumatic. Mouth/Throat: Oropharynx is clear and moist. No oral thrush. Eyes: Conjunctivae are normal. PERRLA. No scleral icterus. Neck: Neck supple. No JVD present. No tracheal deviation present.    Cardiovascular: Normal

## 2023-05-22 ENCOUNTER — HOSPITAL ENCOUNTER (OUTPATIENT)
Age: 68
Discharge: HOME | End: 2023-05-22
Payer: MEDICARE

## 2023-05-22 DIAGNOSIS — T78.40XA ALLERGY, INITIAL ENCOUNTER: ICD-10-CM

## 2023-05-22 DIAGNOSIS — R91.8 MULTIPLE LUNG NODULES ON CT: ICD-10-CM

## 2023-05-22 DIAGNOSIS — Z87.891 EX-SMOKER: ICD-10-CM

## 2023-05-22 DIAGNOSIS — Z57.9 OCCUPATIONAL EXPOSURE IN WORKPLACE: ICD-10-CM

## 2023-05-22 LAB — RHEUMATOID FACT SERPL-ACNC: < 10 IU/ML (ref 0–13)

## 2023-05-22 PROCEDURE — 82103 ALPHA-1-ANTITRYPSIN TOTAL: CPT

## 2023-05-22 PROCEDURE — 86612 BLASTOMYCES ANTIBODY: CPT

## 2023-05-22 PROCEDURE — 86003 ALLG SPEC IGE CRUDE XTRC EA: CPT

## 2023-05-22 PROCEDURE — 86480 TB TEST CELL IMMUN MEASURE: CPT

## 2023-05-22 PROCEDURE — 86038 ANTINUCLEAR ANTIBODIES: CPT

## 2023-05-22 PROCEDURE — 86635 COCCIDIOIDES ANTIBODY: CPT

## 2023-05-22 PROCEDURE — 82164 ANGIOTENSIN I ENZYME TEST: CPT

## 2023-05-22 PROCEDURE — 86698 HISTOPLASMA ANTIBODY: CPT

## 2023-05-22 PROCEDURE — 36415 COLL VENOUS BLD VENIPUNCTURE: CPT

## 2023-05-22 PROCEDURE — 86255 FLUORESCENT ANTIBODY SCREEN: CPT

## 2023-05-22 PROCEDURE — 86430 RHEUMATOID FACTOR TEST QUAL: CPT

## 2023-05-22 PROCEDURE — 82785 ASSAY OF IGE: CPT

## 2023-05-22 PROCEDURE — 86641 CRYPTOCOCCUS ANTIBODY: CPT

## 2023-05-22 SDOH — HEALTH STABILITY - PHYSICAL HEALTH: OCCUPATIONAL EXPOSURE TO UNSPECIFIED RISK FACTOR: Z57.9

## 2023-05-23 LAB
A1AT SERPL-MCNC: 138 MG/DL (ref 90–200)
TRANSFERRIN SERPL-MCNC: 10 U/L (ref 8–52)

## 2023-05-25 LAB
2000687N OAK TREE IGE: < 0.1 KU/L (ref 0–0.34)
ALLERGEN BERMUDA GRASS IGE: < 0.1 KU/L (ref 0–0.34)
ALLERGEN BIRCH IGE: < 0.1 KU/L (ref 0–0.34)
ALLERGEN DOG DANDER IGE: 1.21 KU/L (ref 0–0.34)
ALLERGEN GERMAN COCKROACH IGE: < 0.1 KU/L (ref 0–0.34)
ALLERGEN HORMODENDRUM IGE: < 0.1 KUL/L (ref 0–0.34)
ALLERGEN MOUSE EPITHELIA IGE: < 0.1 KU/L (ref 0–0.34)
ALLERGEN PECAN TREE IGE: < 0.1 KU/L (ref 0–0.34)
ALLERGEN PIGWEED ROUGH IGE: < 0.1 KU/L (ref 0–0.34)
ALLERGEN SHEEP SORREL (W18) IGE: < 0.1 KU/L (ref 0–0.34)
ALLERGEN TREE SYCAMORE: < 0.1 KU/L (ref 0–0.34)
ALLERGEN WALNUT TREE IGE: < 0.1 KU/L (ref 0–0.34)
ALLERGEN WHITE MULBERRY TREE, IGE: < 0.1 KU/L (ref 0–0.34)
ALLERGEN, TREE, WHITE ASH IGE: < 0.1 KU/L (ref 0–0.34)
ALTERNARIA ALTERNATA: 0.64 KU/L (ref 0–0.34)
ANCA AB PATTERN SER IF-IMP: NORMAL
ANCA IGG TITR SER IF: NORMAL {TITER}
ASPERGILLUS FUMIGATUS: < 0.1 KU/L (ref 0–0.34)
CAT DANDER ANTIBODY: 4.04 KU/L (ref 0–0.34)
COTTONWOOD TREE: < 0.1 KU/L (ref 0–0.34)
D. FARINAE: < 0.1 KU/L (ref 0–0.34)
D. PTERONYSSINUS: < 0.1 KU/L (ref 0–0.34)
ELM TREE: < 0.1 KU/L (ref 0–0.34)
GAMMA INTERFERON BACKGROUND BLD IA-ACNC: 0.01 IU/ML
IGE SERPL-ACNC: 67 IU/ML
M TB IFN-G BLD-IMP: NEGATIVE
M TB IFN-G CD4+ BCKGRND COR BLD-ACNC: 0 IU/ML (ref 0–0.34)
M TB IFN-G CD4+CD8+ BCKGRND COR BLD-ACNC: 0 IU/ML (ref 0–0.34)
MAPLE/BOXELDER TREE: < 0.1 KU/L (ref 0–0.34)
MITOGEN IGNF BCKGRD COR BLD-ACNC: > 10 IU/ML
MOUNTAIN CEDAR TREE: < 0.1 KU/L (ref 0–0.34)
MUCOR RACEMOSUS: < 0.1 KU/L (ref 0–0.34)
NUCLEAR IGG SER QL IA: NORMAL
P. NOTATUM: 0.17 KU/L (ref 0–0.34)
RUSSIAN THISTLE: < 0.1 KU/L (ref 0–0.34)
SHORT RAGWD(A ARTEMIS.) IGE: < 0.1 KU/L (ref 0–0.34)
TIMOTHY GRASS: 0.64 KU/L (ref 0–0.34)

## 2023-05-26 LAB
ASPERGILLUS AB TITR SER CF: ABNORMAL {TITER}
B DERMAT AB SER-ACNC: 0.4 IV
COCCIDIOIDES AB TITR SER CF: ABNORMAL {TITER}
H CAPSUL MYC AB TITR SER CF: ABNORMAL {TITER}
H CAPSUL YST AB TITR SER CF: ABNORMAL {TITER}

## 2023-06-20 ENCOUNTER — OFFICE VISIT (OUTPATIENT)
Dept: ENT CLINIC | Age: 68
End: 2023-06-20
Payer: MEDICARE

## 2023-06-20 VITALS
DIASTOLIC BLOOD PRESSURE: 74 MMHG | SYSTOLIC BLOOD PRESSURE: 128 MMHG | OXYGEN SATURATION: 96 % | WEIGHT: 189.1 LBS | HEIGHT: 64 IN | BODY MASS INDEX: 32.28 KG/M2 | TEMPERATURE: 97.3 F | HEART RATE: 83 BPM | RESPIRATION RATE: 20 BRPM

## 2023-06-20 DIAGNOSIS — H93.13 TINNITUS OF BOTH EARS: Primary | ICD-10-CM

## 2023-06-20 DIAGNOSIS — H93.8X3 EAR FULLNESS, BILATERAL: ICD-10-CM

## 2023-06-20 PROCEDURE — 99203 OFFICE O/P NEW LOW 30 MIN: CPT | Performed by: NURSE PRACTITIONER

## 2023-06-20 PROCEDURE — 1123F ACP DISCUSS/DSCN MKR DOCD: CPT | Performed by: NURSE PRACTITIONER

## 2023-06-20 PROCEDURE — 1036F TOBACCO NON-USER: CPT | Performed by: NURSE PRACTITIONER

## 2023-06-20 PROCEDURE — G8417 CALC BMI ABV UP PARAM F/U: HCPCS | Performed by: NURSE PRACTITIONER

## 2023-06-20 PROCEDURE — G8399 PT W/DXA RESULTS DOCUMENT: HCPCS | Performed by: NURSE PRACTITIONER

## 2023-06-20 PROCEDURE — 3017F COLORECTAL CA SCREEN DOC REV: CPT | Performed by: NURSE PRACTITIONER

## 2023-06-20 PROCEDURE — 1090F PRES/ABSN URINE INCON ASSESS: CPT | Performed by: NURSE PRACTITIONER

## 2023-06-20 PROCEDURE — G9899 SCRN MAM PERF RSLTS DOC: HCPCS | Performed by: NURSE PRACTITIONER

## 2023-06-20 PROCEDURE — G8427 DOCREV CUR MEDS BY ELIG CLIN: HCPCS | Performed by: NURSE PRACTITIONER

## 2023-06-20 NOTE — PROGRESS NOTES
[facility] [place] [facility]  [facility], [facility]SE [facility] TH[facility]  [facility] [place], [place]  Dept: [phone]  Dept Fax: [phone]  Loc: [phone]    HPI:     Clarke Vieyra is a 76 y.o. female new patient here for evaluation of tinnitus. Patient was referred by Laci Balderas MD; notes reviewed. Patient reports bilateral tinnitus that started 7-8 months ago. No inciting event or infection. Tinnitus is worse in left ear. Sounds like locusts and is constant, worse in quiet environments. Both ears feel full/plugged. No subjective changes in hearing. Denies any ear drainage, pain or dizziness. She denies any sinonasal symptoms. Retired but worked in [employer] as a  - always used hearing protection. History:     No Known Allergies  Current Outpatient Medications   Medication Sig Dispense Refill    lisinopril (PRINIVIL;ZESTRIL) 5 MG tablet Take 1 tablet by mouth daily      omeprazole (PRILOSEC) 40 MG delayed release capsule Take 1 capsule by mouth daily      atorvastatin (LIPITOR) 20 MG tablet Take 1 tablet by mouth daily      Cholecalciferol (VITAMIN D3) 50 MCG (2000 UT) TABS Take by mouth daily      Calcium Carb-Cholecalciferol (CALCIUM 600 + D PO) Take by mouth daily      Probiotic Product (PROBIOTIC-10 PO) Take by mouth daily      vitamin B-12 (CYANOCOBALAMIN) 1000 MCG tablet Take 1 tablet by mouth daily      Levothyroxine Sodium 50 MCG CAPS Take by mouth Daily      ondansetron (ZOFRAN-ODT) 4 MG disintegrating tablet Take 1 tablet by mouth every 8 hours as needed for Nausea or Vomiting 24 tablet 0    acetaminophen (TYLENOL) 500 MG tablet Take 1 tablet by mouth every 6 hours as needed for Pain       No current facility-administered medications for this visit.      Past Medical History:   Diagnosis Date    Colon polyps 2010    several removed-Dr. Clifford Womack (no path available)    HTN (hypertension)     Hyperlipidemia     Hyperplastic colon polyp

## 2023-08-18 ENCOUNTER — HOSPITAL ENCOUNTER (OUTPATIENT)
Dept: PULMONOLOGY | Age: 68
Discharge: HOME | End: 2023-08-18
Payer: MEDICARE

## 2023-08-18 ENCOUNTER — HOSPITAL ENCOUNTER (OUTPATIENT)
Dept: CT IMAGING | Age: 68
Discharge: HOME | End: 2023-08-18
Payer: MEDICARE

## 2023-08-18 DIAGNOSIS — R91.8 MULTIPLE LUNG NODULES ON CT: ICD-10-CM

## 2023-08-18 DIAGNOSIS — R06.09 DOE (DYSPNEA ON EXERTION): ICD-10-CM

## 2023-08-18 PROCEDURE — 71250 CT THORAX DX C-: CPT

## 2023-08-18 PROCEDURE — 94729 DIFFUSING CAPACITY: CPT

## 2023-08-18 PROCEDURE — 94060 EVALUATION OF WHEEZING: CPT

## 2023-08-18 PROCEDURE — 94726 PLETHYSMOGRAPHY LUNG VOLUMES: CPT

## 2023-08-25 ENCOUNTER — OFFICE VISIT (OUTPATIENT)
Dept: PULMONOLOGY | Age: 68
End: 2023-08-25
Payer: MEDICARE

## 2023-08-25 VITALS
HEIGHT: 64 IN | DIASTOLIC BLOOD PRESSURE: 78 MMHG | SYSTOLIC BLOOD PRESSURE: 128 MMHG | WEIGHT: 190.2 LBS | OXYGEN SATURATION: 96 % | BODY MASS INDEX: 32.47 KG/M2 | TEMPERATURE: 97.5 F | HEART RATE: 74 BPM

## 2023-08-25 DIAGNOSIS — E03.9 HYPOTHYROIDISM, UNSPECIFIED TYPE: ICD-10-CM

## 2023-08-25 DIAGNOSIS — Z57.9 OCCUPATIONAL EXPOSURE IN WORKPLACE: ICD-10-CM

## 2023-08-25 DIAGNOSIS — I10 PRIMARY HYPERTENSION: ICD-10-CM

## 2023-08-25 DIAGNOSIS — Z87.891 EX-SMOKER: Primary | ICD-10-CM

## 2023-08-25 DIAGNOSIS — N63.20 MASS OF LEFT BREAST, UNSPECIFIED QUADRANT: ICD-10-CM

## 2023-08-25 DIAGNOSIS — R91.8 MULTIPLE LUNG NODULES ON CT: ICD-10-CM

## 2023-08-25 DIAGNOSIS — R06.09 DOE (DYSPNEA ON EXERTION): ICD-10-CM

## 2023-08-25 DIAGNOSIS — J44.9 CHRONIC OBSTRUCTIVE PULMONARY DISEASE, UNSPECIFIED COPD TYPE (HCC): ICD-10-CM

## 2023-08-25 DIAGNOSIS — E66.9 OBESITY (BMI 30-39.9): ICD-10-CM

## 2023-08-25 PROCEDURE — 1123F ACP DISCUSS/DSCN MKR DOCD: CPT | Performed by: SPECIALIST

## 2023-08-25 PROCEDURE — 3074F SYST BP LT 130 MM HG: CPT | Performed by: SPECIALIST

## 2023-08-25 PROCEDURE — 3078F DIAST BP <80 MM HG: CPT | Performed by: SPECIALIST

## 2023-08-25 PROCEDURE — 99213 OFFICE O/P EST LOW 20 MIN: CPT | Performed by: SPECIALIST

## 2023-08-25 RX ORDER — ALBUTEROL SULFATE 90 UG/1
2 AEROSOL, METERED RESPIRATORY (INHALATION) 4 TIMES DAILY
Qty: 18 G | Refills: 11 | Status: SHIPPED | OUTPATIENT
Start: 2023-08-25 | End: 2024-08-24

## 2023-08-25 RX ORDER — COVID-19 ANTIGEN TEST
KIT MISCELLANEOUS
COMMUNITY

## 2023-08-25 SDOH — HEALTH STABILITY - PHYSICAL HEALTH: OCCUPATIONAL EXPOSURE TO UNSPECIFIED RISK FACTOR: Z57.9

## 2023-08-25 NOTE — PROGRESS NOTES
[facility]    Patient: Alexx Severino, 76 y.o.   : 1955  2023    Patient of Kj Curran MD   Referring Provider: No ref. provider found       Subjective     Chief Complaint   Patient presents with    Follow-up     Lung nodules / naik 3 mo f/u w cthr, pft, & labs        HPI  Jac Gray is here for further evaluation of her lung problems. Patient was seen by me recently and having nodules on the CAT scan of the chest and also patient is a smoker. Patient was worked up and her IgE is within normal limits but she is allergic to cat and the some environmental factors. Patient denies of having a problem and she did not have any cat. Other blood work for the alpha-1 antitrypsin is within normal limits. TRE rheumatoid factor within normal limits. Fungal serologies negative. Continue to go for TB is negative. TRE and angiotensin-converting enzyme are negative. Except the abnormal CT scan of the chest as noted below. The patient also had pulmonary function testing done on 2023 showed FVC of 2.02 L, 73% addicted. FEV1 of 1.42 L, 65%. With ratio of 70 no significant changes noted with the bronchodilators. TLC and diffusion are within the normal limit suggestive of moderately severe obstructive airway disease. After further questioning patient admits to intermittent cough and also shortness of breath of moderate to severe exertion and also when she is going upstairs. Denies of having any wheezing. She used to be evaluated at present she is retired and did exposed to occupational environment. Immunizations: There is no immunization history for the selected administration types on file for this patient.    Past Medical hx   PMH:  Past Medical History:   Diagnosis Date    Colon polyps     several removed-Dr. Bryant Reid (no path available)    HTN (hypertension)     Hyperlipidemia     Hyperplastic colon polyp 07/10/2017    2 sessiled polyps

## 2023-09-18 ENCOUNTER — HOSPITAL ENCOUNTER (OUTPATIENT)
Dept: PET IMAGING | Age: 68
Discharge: HOME | End: 2023-09-18
Attending: SPECIALIST
Payer: MEDICARE

## 2023-09-18 DIAGNOSIS — N63.20 MASS OF LEFT BREAST, UNSPECIFIED QUADRANT: ICD-10-CM

## 2023-09-18 DIAGNOSIS — R91.8 MULTIPLE LUNG NODULES ON CT: ICD-10-CM

## 2023-09-18 DIAGNOSIS — Z57.9 OCCUPATIONAL EXPOSURE IN WORKPLACE: ICD-10-CM

## 2023-09-18 DIAGNOSIS — J44.9 CHRONIC OBSTRUCTIVE PULMONARY DISEASE, UNSPECIFIED COPD TYPE (HCC): ICD-10-CM

## 2023-09-18 DIAGNOSIS — Z87.891 EX-SMOKER: ICD-10-CM

## 2023-09-18 PROCEDURE — 3430000000 HC RX DIAGNOSTIC RADIOPHARMACEUTICAL: Performed by: SPECIALIST

## 2023-09-18 PROCEDURE — A9552 F18 FDG: HCPCS | Performed by: SPECIALIST

## 2023-09-18 PROCEDURE — 78815 PET IMAGE W/CT SKULL-THIGH: CPT

## 2023-09-18 RX ORDER — FLUDEOXYGLUCOSE F 18 200 MCI/ML
10.6 INJECTION, SOLUTION INTRAVENOUS
Status: COMPLETED | OUTPATIENT
Start: 2023-09-18 | End: 2023-09-18

## 2023-09-18 RX ADMIN — FLUDEOXYGLUCOSE F 18 10.6 MILLICURIE: 200 INJECTION, SOLUTION INTRAVENOUS at 09:42

## 2023-09-18 SDOH — HEALTH STABILITY - PHYSICAL HEALTH: OCCUPATIONAL EXPOSURE TO UNSPECIFIED RISK FACTOR: Z57.9

## 2023-10-04 ENCOUNTER — OFFICE VISIT (OUTPATIENT)
Dept: PULMONOLOGY | Age: 68
End: 2023-10-04
Payer: MEDICARE

## 2023-10-04 VITALS
SYSTOLIC BLOOD PRESSURE: 128 MMHG | TEMPERATURE: 98.3 F | WEIGHT: 191.6 LBS | OXYGEN SATURATION: 95 % | BODY MASS INDEX: 32.71 KG/M2 | DIASTOLIC BLOOD PRESSURE: 70 MMHG | HEART RATE: 79 BPM | HEIGHT: 64 IN

## 2023-10-04 DIAGNOSIS — N63.20 MASS OF LEFT BREAST, UNSPECIFIED QUADRANT: ICD-10-CM

## 2023-10-04 DIAGNOSIS — R91.8 MULTIPLE LUNG NODULES ON CT: Primary | ICD-10-CM

## 2023-10-04 DIAGNOSIS — J44.9 CHRONIC OBSTRUCTIVE PULMONARY DISEASE, UNSPECIFIED COPD TYPE (HCC): ICD-10-CM

## 2023-10-04 DIAGNOSIS — E66.9 OBESITY (BMI 30-39.9): ICD-10-CM

## 2023-10-04 DIAGNOSIS — Z87.891 EX-SMOKER: ICD-10-CM

## 2023-10-04 PROCEDURE — 1123F ACP DISCUSS/DSCN MKR DOCD: CPT | Performed by: SPECIALIST

## 2023-10-04 PROCEDURE — 99213 OFFICE O/P EST LOW 20 MIN: CPT | Performed by: SPECIALIST

## 2023-10-04 NOTE — PROGRESS NOTES
[facility]    Patient: Kip Crane, 76 y.o.   : 1955  10/4/2023    Patient of Shu Harry MD   Referring Provider: No ref. provider found       Subjective     Chief Complaint   Patient presents with    Follow-up     Multiple lung nodules 1 month f/u after PET scan 23. GOOD Swain is here for follow up for her lung problems and lung nodules. Immunizations: There is no immunization history for the selected administration types on file for this patient.    Past Medical hx   PMH:  Past Medical History:   Diagnosis Date    Colon polyps     several removed-Dr. Vicki Boogie (no path available)    HTN (hypertension)     Hyperlipidemia     Hyperplastic colon polyp 07/10/2017    2 sessiled polyps removed-Dr. Andra Soto    Hypothyroid     Papilloma     left breast     SURGICAL HISTORY:  Past Surgical History:   Procedure Laterality Date    BACK SURGERY  2011    BREAST LUMPECTOMY Left 2022    LEFT BREAST LUMPECTOMY WITH PREOP NEEDLE LOC performed by Izella Homans, MD at [address] Left 2005    [facility]    BREAST SURGERY Left 2023    EXCISION LEFT BREAST MASS WITH PRIOR NEEDLE LOC performed by Izella Homans, MD at [address] [facility] Left 1992     SECTION      x 4    COLONOSCOPY  07/10/2017    Dr. Julia Grace Left 2015    FOOT SURGERY Left 2017    PEG US GUID NDL BIOPSY LEFT Left 2022    benign tribell clip    TUBAL LIGATION      US BREAST BIOPSY NEEDLE ADDITIONAL LEFT Left 2022    papilloma barbell clip    US GUIDED NEEDLE LOC OF LEFT BREAST Left 2022    papilloma barbell clip    US GUIDED NEEDLE LOC OF LEFT BREAST Left 2023    US GUIDED NEEDLE LOC OF LEFT BREAST 2023 [facility] [address]     SOCIAL HISTORY:  Social History     Tobacco Use    Smoking status: Former     Packs/day: 2.00

## 2023-10-17 ENCOUNTER — OFFICE VISIT (OUTPATIENT)
Dept: ENT CLINIC | Age: 68
End: 2023-10-17
Payer: MEDICARE

## 2023-10-17 ENCOUNTER — HOSPITAL ENCOUNTER (OUTPATIENT)
Dept: AUDIOLOGY | Age: 68
Discharge: HOME | End: 2023-10-17
Payer: MEDICARE

## 2023-10-17 VITALS
SYSTOLIC BLOOD PRESSURE: 124 MMHG | HEIGHT: 64 IN | BODY MASS INDEX: 32.68 KG/M2 | HEART RATE: 74 BPM | RESPIRATION RATE: 20 BRPM | OXYGEN SATURATION: 97 % | WEIGHT: 191.4 LBS | TEMPERATURE: 97.3 F | DIASTOLIC BLOOD PRESSURE: 74 MMHG

## 2023-10-17 DIAGNOSIS — H93.13 BILATERAL TINNITUS: ICD-10-CM

## 2023-10-17 DIAGNOSIS — H90.3 SENSORINEURAL HEARING LOSS (SNHL), BILATERAL: Primary | ICD-10-CM

## 2023-10-17 PROCEDURE — 1090F PRES/ABSN URINE INCON ASSESS: CPT | Performed by: NURSE PRACTITIONER

## 2023-10-17 PROCEDURE — G9899 SCRN MAM PERF RSLTS DOC: HCPCS | Performed by: NURSE PRACTITIONER

## 2023-10-17 PROCEDURE — 1036F TOBACCO NON-USER: CPT | Performed by: NURSE PRACTITIONER

## 2023-10-17 PROCEDURE — 99212 OFFICE O/P EST SF 10 MIN: CPT | Performed by: NURSE PRACTITIONER

## 2023-10-17 PROCEDURE — 92557 COMPREHENSIVE HEARING TEST: CPT | Performed by: AUDIOLOGIST

## 2023-10-17 PROCEDURE — G8427 DOCREV CUR MEDS BY ELIG CLIN: HCPCS | Performed by: NURSE PRACTITIONER

## 2023-10-17 PROCEDURE — G8484 FLU IMMUNIZE NO ADMIN: HCPCS | Performed by: NURSE PRACTITIONER

## 2023-10-17 PROCEDURE — G8417 CALC BMI ABV UP PARAM F/U: HCPCS | Performed by: NURSE PRACTITIONER

## 2023-10-17 PROCEDURE — G8399 PT W/DXA RESULTS DOCUMENT: HCPCS | Performed by: NURSE PRACTITIONER

## 2023-10-17 PROCEDURE — 1123F ACP DISCUSS/DSCN MKR DOCD: CPT | Performed by: NURSE PRACTITIONER

## 2023-10-17 PROCEDURE — 3017F COLORECTAL CA SCREEN DOC REV: CPT | Performed by: NURSE PRACTITIONER

## 2023-10-17 PROCEDURE — 92567 TYMPANOMETRY: CPT | Performed by: AUDIOLOGIST

## 2023-10-17 NOTE — PATIENT INSTRUCTIONS
Avoid the \"5 C's\" - Caffeine, Carbonation, Citrus, Chocolate and Cocktails (alcohol, including beer). If you are a smoker, Cigarettes are considered another \"C\". Do not eat within 2-3 hours of lying down for the night. Elevate head of bed. Gastroesophageal Reflux Disease (GERD): Care Instructions  Overview     Gastroesophageal reflux disease (GERD) is the backward flow of stomach acid into the esophagus. The esophagus is the tube that leads from your throat to your stomach. A one-way valve prevents the stomach acid from backing up into this tube. But when you have GERD, this valve does not close tightly enough. This can also cause pain and swelling in your esophagus. (This is called esophagitis.)  If you have mild GERD symptoms including heartburn, you may be able to control the problem with antacids or over-the-counter medicine. You can also make lifestyle changes to help reduce your symptoms. These include changing your diet and eating habits, such as not eating late at night and losing weight. Follow-up care is a key part of your treatment and safety. Be sure to make and go to all appointments, and call your doctor if you are having problems. It's also a good idea to know your test results and keep a list of the medicines you take. How can you care for yourself at home? Take your medicines exactly as prescribed. Call your doctor if you think you are having a problem with your medicine. Your doctor may recommend over-the-counter medicine. For mild or occasional indigestion, antacids, such as Tums, Gaviscon, Mylanta, or Maalox, may help. Your doctor also may recommend over-the-counter acid reducers, such as famotidine (Pepcid AC), cimetidine (Tagamet HB), or omeprazole (Prilosec). Read and follow all instructions on the label. If you use these medicines often, talk with your doctor. Change your eating habits. It's best to eat several small meals instead of two or three large meals.   After you eat, wait

## 2023-10-17 NOTE — PROGRESS NOTES
AUDIOLOGICAL EVALUATION      REASON FOR TESTING:  Audiometric evaluation per the request of Gale Pizarro, due to the diagnosis of tinnitus of both ears and ear fullness, bilaterally. Patient reports bilateral tinnitus that started about one year ago. She had not been ill at the time. The tinnitus has not improved. Both ears feel full/plugged with the right ear being the worst. She has not noticed any hearing loss. Otalgia, otorrhea, and previous history of ear issues were denied. She did have vertigo about 1 year ago that resolve with vestibular therapy. She reported a previous history of occupational noise exposure from [employer] work. OTOSCOPY: Clear canal- bilaterally     AUDIOGRAM          Reliability: Good    COMMENTS: Normal hearing at 250-2000 Hz sloping to a mild sensorineural hearing loss at 6866-2103 Hz in both ears. Word recognition ability is good at 96% for the left ear and excellent at 100% for the right ear. Tympanometry revealed normal peak pressure and normal middle ear compliance for both ears. RECOMMENDATION(S):   1- Follow up with YON Nash regarding these results and any further testing or treatment recommendations. 2- Repeat testing as medically indicated or annually to monitor for progression, sooner with any significant changes or new concerns. 3- Binaural hearing aids are recommended pending medical clearance and patient motivation.

## 2023-11-08 ENCOUNTER — TELEPHONE (OUTPATIENT)
Dept: ENT CLINIC | Age: 68
End: 2023-11-08

## 2023-11-08 NOTE — TELEPHONE ENCOUNTER
Please check in with patient to see how her voice is doing. If no improvement at all, she will need scheduled at available for appt with physician for likely scope.

## 2023-11-17 NOTE — TELEPHONE ENCOUNTER
Attempted to call patient. Got voicemail, left message to call the office    We have attempted to call 3 times will send a letter Today.

## 2023-12-29 ENCOUNTER — TELEPHONE (OUTPATIENT)
Dept: ENT CLINIC | Age: 68
End: 2023-12-29

## 2023-12-29 NOTE — TELEPHONE ENCOUNTER
Patient called in stating that she was calling back to let antoinette know that her voice is better but not where it should be. And she also stated that she will be getting a scope done this Tuesday to get a biopsy of her lungs.

## 2024-01-02 NOTE — TELEPHONE ENCOUNTER
I called and spoke with patient she is on her way to [facility] for bx. She said she will call back later to schedule.

## 2024-02-08 ENCOUNTER — HOSPITAL ENCOUNTER (OUTPATIENT)
Age: 69
Discharge: HOME | End: 2024-02-08
Payer: MEDICARE

## 2024-02-08 ENCOUNTER — OFFICE VISIT (OUTPATIENT)
Dept: ALLERGY | Age: 69
End: 2024-02-08

## 2024-02-08 VITALS
DIASTOLIC BLOOD PRESSURE: 78 MMHG | BODY MASS INDEX: 32.44 KG/M2 | WEIGHT: 190 LBS | HEART RATE: 94 BPM | SYSTOLIC BLOOD PRESSURE: 145 MMHG | RESPIRATION RATE: 16 BRPM | HEIGHT: 64 IN | OXYGEN SATURATION: 96 %

## 2024-02-08 DIAGNOSIS — J30.1 ALLERGY TO TREES: ICD-10-CM

## 2024-02-08 DIAGNOSIS — J30.89 ALLERGIC RHINITIS CAUSED BY MOLD: ICD-10-CM

## 2024-02-08 DIAGNOSIS — T78.2XXA ANAPHYLAXIS, INITIAL ENCOUNTER: ICD-10-CM

## 2024-02-08 DIAGNOSIS — L50.9 URTICARIA: ICD-10-CM

## 2024-02-08 DIAGNOSIS — J30.81 CAT ALLERGIES: ICD-10-CM

## 2024-02-08 DIAGNOSIS — Z91.048 ALLERGY TO MOLD: ICD-10-CM

## 2024-02-08 DIAGNOSIS — K44.9 HIATAL HERNIA: ICD-10-CM

## 2024-02-08 DIAGNOSIS — Z91.018 FOOD ALLERGY: ICD-10-CM

## 2024-02-08 DIAGNOSIS — Z77.098 CHEMICAL EXPOSURE: ICD-10-CM

## 2024-02-08 DIAGNOSIS — T61.91XA ALLERGIC REACTION TO FISH: ICD-10-CM

## 2024-02-08 DIAGNOSIS — M47.816 ARTHRITIS OF LUMBAR SPINE: ICD-10-CM

## 2024-02-08 DIAGNOSIS — J30.1 ACUTE SEASONAL ALLERGIC RHINITIS DUE TO POLLEN: ICD-10-CM

## 2024-02-08 DIAGNOSIS — T78.3XXA: ICD-10-CM

## 2024-02-08 DIAGNOSIS — E04.9 ENLARGED THYROID: ICD-10-CM

## 2024-02-08 DIAGNOSIS — J32.4 CHRONIC PANSINUSITIS: ICD-10-CM

## 2024-02-08 DIAGNOSIS — Z91.013 ALLERGIC TO SEAFOOD: ICD-10-CM

## 2024-02-08 DIAGNOSIS — R06.02 SHORT OF BREATH ON EXERTION: ICD-10-CM

## 2024-02-08 DIAGNOSIS — B39.9 HISTOPLASMOSIS: ICD-10-CM

## 2024-02-08 DIAGNOSIS — B49 FUNGAL INFECTION: ICD-10-CM

## 2024-02-08 DIAGNOSIS — T78.2XXA ANAPHYLAXIS, INITIAL ENCOUNTER: Primary | ICD-10-CM

## 2024-02-08 DIAGNOSIS — K57.30 DIVERTICULA OF COLON: ICD-10-CM

## 2024-02-08 DIAGNOSIS — Z91.09 MITE ALLERGY: ICD-10-CM

## 2024-02-08 LAB
BASOPHILS ABSOLUTE: 0 THOU/MM3 (ref 0–0.1)
BASOPHILS NFR BLD AUTO: 0.9 %
DEPRECATED RDW RBC AUTO: 42.5 FL (ref 35–45)
EOSINOPHIL NFR BLD AUTO: 3.2 %
EOSINOPHILS ABSOLUTE: 0.2 THOU/MM3 (ref 0–0.4)
ERYTHROCYTE [DISTWIDTH] IN BLOOD BY AUTOMATED COUNT: 12.4 % (ref 11.5–14.5)
HCT VFR BLD AUTO: 46.2 % (ref 37–47)
HGB BLD-MCNC: 15 GM/DL (ref 12–16)
IMM GRANULOCYTES # BLD AUTO: 0.02 THOU/MM3 (ref 0–0.07)
IMM GRANULOCYTES NFR BLD AUTO: 0.4 %
LYMPHOCYTES ABSOLUTE: 1.7 THOU/MM3 (ref 1–4.8)
LYMPHOCYTES NFR BLD AUTO: 31 %
MCH RBC QN AUTO: 30.4 PG (ref 26–33)
MCHC RBC AUTO-ENTMCNC: 32.5 GM/DL (ref 32.2–35.5)
MCV RBC AUTO: 93.5 FL (ref 81–99)
MONOCYTES ABSOLUTE: 0.4 THOU/MM3 (ref 0.4–1.3)
MONOCYTES NFR BLD AUTO: 8.3 %
NEUTROPHILS NFR BLD AUTO: 56.2 %
NRBC BLD AUTO-RTO: 0 /100 WBC
PLATELET # BLD AUTO: 290 THOU/MM3 (ref 130–400)
PMV BLD AUTO: 9.8 FL (ref 9.4–12.4)
RBC # BLD AUTO: 4.94 MILL/MM3 (ref 4.2–5.4)
SEGMENTED NEUTROPHILS ABSOLUTE COUNT: 3 THOU/MM3 (ref 1.8–7.7)
WBC # BLD AUTO: 5.4 THOU/MM3 (ref 4.8–10.8)

## 2024-02-08 PROCEDURE — 82525 ASSAY OF COPPER: CPT

## 2024-02-08 PROCEDURE — 83655 ASSAY OF LEAD: CPT

## 2024-02-08 PROCEDURE — 83520 IMMUNOASSAY QUANT NOS NONAB: CPT

## 2024-02-08 PROCEDURE — 82784 ASSAY IGA/IGD/IGG/IGM EACH: CPT

## 2024-02-08 PROCEDURE — 86612 BLASTOMYCES ANTIBODY: CPT

## 2024-02-08 PROCEDURE — 86698 HISTOPLASMA ANTIBODY: CPT

## 2024-02-08 PROCEDURE — 86606 ASPERGILLUS ANTIBODY: CPT

## 2024-02-08 PROCEDURE — 86635 COCCIDIOIDES ANTIBODY: CPT

## 2024-02-08 PROCEDURE — 85025 COMPLETE CBC W/AUTO DIFF WBC: CPT

## 2024-02-08 PROCEDURE — 82785 ASSAY OF IGE: CPT

## 2024-02-08 PROCEDURE — 36415 COLL VENOUS BLD VENIPUNCTURE: CPT

## 2024-02-08 PROCEDURE — 86682 HELMINTH ANTIBODY: CPT

## 2024-02-08 RX ORDER — BENZONATATE 100 MG/1
200 CAPSULE ORAL 3 TIMES DAILY PRN
Qty: 60 CAPSULE | Refills: 0 | Status: SHIPPED | OUTPATIENT
Start: 2024-02-08

## 2024-02-08 RX ORDER — EPINEPHRINE 0.3 MG/.3ML
INJECTION SUBCUTANEOUS
COMMUNITY
Start: 2024-01-24

## 2024-02-08 RX ORDER — AMLODIPINE BESYLATE 2.5 MG/1
2.5 TABLET ORAL DAILY
COMMUNITY
Start: 2024-01-19

## 2024-02-08 RX ORDER — FLUTICASONE FUROATE, UMECLIDINIUM BROMIDE AND VILANTEROL TRIFENATATE 100; 62.5; 25 UG/1; UG/1; UG/1
1 POWDER RESPIRATORY (INHALATION) DAILY
COMMUNITY
Start: 2023-11-01

## 2024-02-08 ASSESSMENT — ENCOUNTER SYMPTOMS
RHINORRHEA: 1
CONSTIPATION: 1
SINUS PAIN: 1
SORE THROAT: 1
SINUS PRESSURE: 1
TROUBLE SWALLOWING: 1
SHORTNESS OF BREATH: 1

## 2024-02-08 NOTE — PATIENT INSTRUCTIONS
Tell pulmonologist next visit you were a  for 10 years give or take. 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 ANTIHISTAMINES - ZYRTEC (CETIRIZINE), CLARITIN (LORATADINE), XYZAL (LEVOCETIRIZINE), BENADRYL (DIPHENHYDRAMINE), HYDROXYZINE, VISTARIL, ALLEGRA (FEXOFENADINE), DOXYALAMINE  2. STOP NASAL SPRAYS/RINSES - NASOCORT, FLONASE, NASONEX, ASTELIN, ANTIVERT (MECLIZINE),BUDESONIDE, XHANCE  3. STOP STOMACH MEDICATIONS - PEPCID, FAMOTIDINE, ZANTAC, RANITIDINE  4. STOP SINGULAIR OR MONTELUKAST  5. IF POSSIBLE HOLD INHALERS THE DAY OF TESTING BUT BRING WITH YOU TO USE AFTER THE ALLERGY TESTING  6. ELAVIL (AMITRIPTYLINE) - MUST DISCUSS WITH BRET JOYCE OR PCP PRIOR TO HOLDING    IF YOU ARE PLACED ON ANY NEW MEDICATIONS BETWEEN NOW AND THE TIME OF YOUR ALLERGY TESTING BY ANY OTHER PROVIDER CALL THE OFFICE TO VERIFY IF THIS WILL INTERFERE WITH ALLERGY TESTING    PLEASE SHOWER THE MORNING OF ALLERGY TESTING.  THIS IS VERY IMPORTANT FOR TESTING AND INFECTION CONTROL PRIOR TO SKIN ALLERGY TESTING      IF ANY LABS ARE DONE, YOU WILL BE NOTIFIED IN OF ABNORMALITIES THAT ARE CONCERNING TO THE PROVIDER.   OTHERWISE LABS WILL BE REVIEWED AT YOUR NEXT VISIT.    How To Prepare For Your Allergy Test    As you embark on your allergy testing, there are several important things to remember.  Your testing process may take 60-90 minutes, but will result in information on which allergens you react to and how severely you react to each one.  Be sure and inform your provider and staff if you are not feeling well or have started taking any new medications or have developed any new medical problems prior to the testing.    The testing will be a scratch test, usually done on the back.  Wear comfortable clothing that allows for easy exposure of this area.  It is best to avoid tight or

## 2024-02-08 NOTE — PROGRESS NOTES
abdomen.  ___________________________________    IMPRESSION:  Slight atelectasis versus trace pleural thickening of the bilateral  costophrenic angles. No airspace consolidation.    Electronically Signed:  Edenilson Bansal MD ([place])  2024/01/08 at 12:21 EST  Reading Location ID and State: [address] [place] [address]  Tel [phone], Service support  [phone], Fax [phone]          cc: Tristian Cochran MD      Dictated by:  ISRAEL Bansal MD on 01/08/24 1221  Transcribed by:  ISRAEL Bansal on 01/08/24 1221    Report Signed by:  Rolf QUACH,ISRAEL Stewart on 01/08/24 1221     No results found for this or any previous visit.     No results found for this or any previous visit.       All current and previous medial labs have been reviewed and discussed with patient       Assessment/Plan   Maria Luisa was seen today for new patient.    Diagnoses and all orders for this visit:    Anaphylaxis, initial encounter    Enlarged thyroid  -     US HEAD NECK SOFT TISSUE THYROID; Future    Allergic rhinitis caused by mold  -     MISCELLANEOUS SENDOUT VEGATABLE PROFILE IGE ARUP 1296322; Future  -     MISCELLANEOUS SENDOUT PEACH IGE ARUP [id number]; Future  -     Clams IgE; Future  -     MISCELLANEOUS SENDOUT ARUP [id number]; Future  -     Laura IgE; Future  -     Mussel IgE; Future  -     Oyster IgE; Future  -     [place] IgE; Future  -     Scallop IgE; Future  -     Seafood panel IgE; Future  -     MISCELLANEOUS SENDOUT SOLE IGE 7798688; Future  -     MISCELLANEOUS SENDOUT SWORDFISH IGE 2669633; Future  -     CBC with Auto Differential; Future  -     IgA; Future  -     IgE; Future  -     IgG; Future  -     IgM; Future  -     Strongyloides Ab, IgG; Future  -     Fungal Antibodies Quant By CF; Future  -     benzonatate (TESSALON) 100 MG capsule; Take 2 capsules by mouth 3 times daily as needed for Cough  -     Culture, Aerobic and Anaerobic  -     Culture, Fungus    Cat allergies    Histoplasmosis    Fungal infection    Short of breath on

## 2024-02-09 LAB
FUNGUS SPEC FUNGUS STN: NORMAL
IGA SERPL-MCNC: 142 MG/DL (ref 70–400)
IGE SERPL-ACNC: 68 IU/ML
IGG SERPL-MCNC: 930 MG/DL (ref 700–1600)
IGM SERPL-MCNC: 107 MG/DL (ref 40–230)

## 2024-02-11 LAB
BACTERIA SPEC AEROBE CULT: ABNORMAL
BACTERIA SPEC AEROBE CULT: ABNORMAL
COPPER SERPL-MCNC: 149.2 UG/DL (ref 80–155)
GRAM STN SPEC: ABNORMAL
ORGANISM: ABNORMAL
TRYPTASE SERPL-MCNC: NORMAL NG/ML

## 2024-02-12 ENCOUNTER — TELEPHONE (OUTPATIENT)
Dept: ALLERGY | Age: 69
End: 2024-02-12

## 2024-02-12 DIAGNOSIS — B96.3: Primary | ICD-10-CM

## 2024-02-12 LAB
ASPERGILLUS AB TITR SER CF: ABNORMAL {TITER}
B DERMAT AB SER-ACNC: 0.3 IV
COCCIDIOIDES AB TITR SER CF: ABNORMAL {TITER}
FUNGUS SPEC CULT: NORMAL
FUNGUS SPEC FUNGUS STN: NORMAL
H CAPSUL MYC AB TITR SER CF: ABNORMAL {TITER}
H CAPSUL YST AB TITR SER CF: ABNORMAL {TITER}
LEAD BLDV-MCNC: < 2 UG/DL
STRONGYLOIDES IGG SER IA-ACNC: NORMAL

## 2024-02-12 RX ORDER — AMOXICILLIN AND CLAVULANATE POTASSIUM 875; 125 MG/1; MG/1
1 TABLET, FILM COATED ORAL 2 TIMES DAILY
Qty: 20 TABLET | Refills: 0 | Status: SHIPPED | OUTPATIENT
Start: 2024-02-12 | End: 2024-02-22

## 2024-02-12 NOTE — TELEPHONE ENCOUNTER
----- Message from ANABELA Moran CNP sent at 2/12/2024  3:55 PM EST -----  Please tell patient that her culture was positive for haemophilus influenzae.  I have sent in Augmentin for her to take

## 2024-02-12 NOTE — TELEPHONE ENCOUNTER
Called pt and informed culture results. Informed that provider sent in antibiotics for that.  Pt verbalized understanding and thanked me.

## 2024-02-13 DIAGNOSIS — B39.9 HISTOPLASMOSIS: Primary | ICD-10-CM

## 2024-02-13 RX ORDER — ITRACONAZOLE 100 MG/1
200 CAPSULE ORAL 2 TIMES DAILY
Qty: 60 CAPSULE | Refills: 2 | Status: SHIPPED | OUTPATIENT
Start: 2024-02-13

## 2024-02-13 NOTE — PROGRESS NOTES
Patient with histoplasmosis. Stop omeprazole, calcium with vit D, and atorvastatin.  Do not restart these meds until after 2 weeks following completion of itraconazole.  Will need to repeat CT scan at completion of itraconazole.  Will also need to restart cholesterol medicine, calcium with vitamin D and omeprazole once the oxiconazole is finished and she is waited 2 weeks

## 2024-02-14 LAB
BACTERIA SPEC AEROBE CULT: ABNORMAL
BACTERIA SPEC AEROBE CULT: ABNORMAL
BACTERIA SPEC ANAEROBE CULT: ABNORMAL
GRAM STN SPEC: ABNORMAL
ORGANISM: ABNORMAL

## 2024-02-15 LAB — MISC REFERENCE: NORMAL

## 2024-02-19 ENCOUNTER — TELEPHONE (OUTPATIENT)
Dept: ALLERGY | Age: 69
End: 2024-02-19

## 2024-02-19 NOTE — TELEPHONE ENCOUNTER
Called pt and informed that she can take motrin and not tylenol. Pt is asking what she can take for heartburn? Also she is asking if she can take tessalon tablets for a cough.    Please advise.

## 2024-02-19 NOTE — TELEPHONE ENCOUNTER
Pt was put on itraconazole on 2-8 and now has a cough and heartburn - wants to know if she can take tylenol or motrin? [phone] erika

## 2024-02-21 ENCOUNTER — HOSPITAL ENCOUNTER (OUTPATIENT)
Age: 69
Discharge: HOME | End: 2024-02-21
Payer: MEDICARE

## 2024-02-21 LAB — TSH SERPL DL<=0.005 MIU/L-ACNC: 0.49 UIU/ML (ref 0.4–4.2)

## 2024-02-21 PROCEDURE — 36415 COLL VENOUS BLD VENIPUNCTURE: CPT

## 2024-02-21 PROCEDURE — 84443 ASSAY THYROID STIM HORMONE: CPT

## 2024-03-01 ENCOUNTER — HOSPITAL ENCOUNTER (OUTPATIENT)
Dept: CT IMAGING | Age: 69
Discharge: HOME | End: 2024-03-01
Payer: MEDICARE

## 2024-03-01 ENCOUNTER — HOSPITAL ENCOUNTER (OUTPATIENT)
Dept: ULTRASOUND IMAGING | Age: 69
Discharge: HOME | End: 2024-03-01
Payer: MEDICARE

## 2024-03-01 DIAGNOSIS — T61.91XA ALLERGIC REACTION TO FISH: ICD-10-CM

## 2024-03-01 DIAGNOSIS — Z91.018 FOOD ALLERGY: ICD-10-CM

## 2024-03-01 DIAGNOSIS — B39.9 HISTOPLASMOSIS: ICD-10-CM

## 2024-03-01 DIAGNOSIS — E04.9 ENLARGED THYROID: ICD-10-CM

## 2024-03-01 DIAGNOSIS — J32.4 CHRONIC PANSINUSITIS: ICD-10-CM

## 2024-03-01 PROCEDURE — 71250 CT THORAX DX C-: CPT

## 2024-03-01 PROCEDURE — 76536 US EXAM OF HEAD AND NECK: CPT

## 2024-03-01 PROCEDURE — 70486 CT MAXILLOFACIAL W/O DYE: CPT

## 2024-03-04 ENCOUNTER — TELEPHONE (OUTPATIENT)
Dept: ALLERGY | Age: 69
End: 2024-03-04

## 2024-03-04 DIAGNOSIS — E04.1 THYROID NODULE: Primary | ICD-10-CM

## 2024-03-04 NOTE — TELEPHONE ENCOUNTER
----- Message from ANABELA Moran CNP sent at 3/4/2024  8:19 AM EST -----  Patient needs to continue to be followed by pulmonary.  Make sure that we reach out to her and tell

## 2024-03-04 NOTE — TELEPHONE ENCOUNTER
----- Message from ANABELA Moran CNP sent at 3/4/2024  8:12 AM EST -----  I am going to go ahead and refer this patient to be evaluated by endocrinology.  To see what they think about the nodule.  She possibly could just wait for follow-up ultrasound by her PCP.  Regardless someone needs to watch this at least annually to   determine if it is getting bigger

## 2024-03-04 NOTE — PROGRESS NOTES
Thyroid nodule found on patient's ultrasound of the neck.  Will refer to endocrinology for evaluation.  Nodule is a TR 4 and has been recommended follow-up at least annually.  Patient notified by staff

## 2024-03-04 NOTE — TELEPHONE ENCOUNTER
Called patient and she is still following Pulmonary. She stated that her Dr. Jon Land would like the CT faxed to him at (F) [phone]. (P) [phone]. She is aware of being referred to endocrinology and will be waiting for her call.

## 2024-03-04 NOTE — TELEPHONE ENCOUNTER
----- Message from ANABELA Moran CNP sent at 3/4/2024  8:14 AM EST -----  Please let the patient know that referred her to endocrinology.  Someone should be calling her

## 2024-03-11 LAB
FUNGUS SPEC CULT: NORMAL
FUNGUS SPEC FUNGUS STN: NORMAL

## 2024-03-12 ENCOUNTER — OFFICE VISIT (OUTPATIENT)
Dept: ENT CLINIC | Age: 69
End: 2024-03-12
Payer: MEDICARE

## 2024-03-12 VITALS
HEART RATE: 73 BPM | BODY MASS INDEX: 33.07 KG/M2 | WEIGHT: 193.7 LBS | OXYGEN SATURATION: 95 % | SYSTOLIC BLOOD PRESSURE: 122 MMHG | TEMPERATURE: 97.7 F | DIASTOLIC BLOOD PRESSURE: 78 MMHG | RESPIRATION RATE: 18 BRPM | HEIGHT: 64 IN

## 2024-03-12 DIAGNOSIS — R49.0 HOARSENESS: Primary | ICD-10-CM

## 2024-03-12 DIAGNOSIS — K21.9 GERD WITHOUT ESOPHAGITIS: ICD-10-CM

## 2024-03-12 DIAGNOSIS — K21.9 LARYNGOPHARYNGEAL REFLUX (LPR): ICD-10-CM

## 2024-03-12 DIAGNOSIS — R09.A2 SENSATION OF FOREIGN BODY IN LARYNX: ICD-10-CM

## 2024-03-12 PROCEDURE — 1036F TOBACCO NON-USER: CPT | Performed by: OTOLARYNGOLOGY

## 2024-03-12 PROCEDURE — G9899 SCRN MAM PERF RSLTS DOC: HCPCS | Performed by: OTOLARYNGOLOGY

## 2024-03-12 PROCEDURE — 99203 OFFICE O/P NEW LOW 30 MIN: CPT | Performed by: OTOLARYNGOLOGY

## 2024-03-12 PROCEDURE — 3017F COLORECTAL CA SCREEN DOC REV: CPT | Performed by: OTOLARYNGOLOGY

## 2024-03-12 PROCEDURE — G8484 FLU IMMUNIZE NO ADMIN: HCPCS | Performed by: OTOLARYNGOLOGY

## 2024-03-12 PROCEDURE — G8399 PT W/DXA RESULTS DOCUMENT: HCPCS | Performed by: OTOLARYNGOLOGY

## 2024-03-12 PROCEDURE — 31575 DIAGNOSTIC LARYNGOSCOPY: CPT | Performed by: OTOLARYNGOLOGY

## 2024-03-12 PROCEDURE — 1123F ACP DISCUSS/DSCN MKR DOCD: CPT | Performed by: OTOLARYNGOLOGY

## 2024-03-12 PROCEDURE — G8427 DOCREV CUR MEDS BY ELIG CLIN: HCPCS | Performed by: OTOLARYNGOLOGY

## 2024-03-12 PROCEDURE — 1090F PRES/ABSN URINE INCON ASSESS: CPT | Performed by: OTOLARYNGOLOGY

## 2024-03-12 PROCEDURE — G8417 CALC BMI ABV UP PARAM F/U: HCPCS | Performed by: OTOLARYNGOLOGY

## 2024-03-12 RX ORDER — METOPROLOL SUCCINATE 25 MG/1
25 TABLET, EXTENDED RELEASE ORAL DAILY
COMMUNITY

## 2024-03-12 NOTE — PATIENT INSTRUCTIONS
Do not fill up stomach for 3 hours before bedtime. Elevate the head of the bed, perhaps with a foam wedge.  May take omeprazole with supper.  Do not clear your throat.  Carry a bottle of water.  Do not clear your throat when you cough.

## 2024-03-12 NOTE — PROGRESS NOTES
[facility] [place] [facility]  [facility]  [address]   [address]  [facility] [address]  Dept: [phone]  Dept Fax: [phone]  Loc: [phone]    Maria Luisa Quesada is a 69 y.o. female who was referred by No ref. provider found for:  Chief Complaint   Patient presents with    Other     Patient is here today for voice issues. Patient states this has been ongoing for awhile. She had a biopsy done at the beginning of January and said ever since then she has been having a lot more issues.   .    HPI:     Maria Luisa Quesada is a 69 y.o. female who presents today for hoarseness for the last couple of months with foreign body sensation.  This began sometime after her procedure January.  She frequently feels like she should clear her throat.  She does not follow a GERD regimen..    History:     Allergies   Allergen Reactions    Fish-Derived Products Angioedema    Lisinopril Angioedema     Current Outpatient Medications   Medication Sig Dispense Refill    metoprolol succinate (TOPROL XL) 25 MG extended release tablet Take 1 tablet by mouth daily      Ascorbic Acid 125 MG CHEW Take 500 mg by mouth daily      EPINEPHrine (EPIPEN) 0.3 MG/0.3ML SOAJ injection INJECT 0.3 MILLILITER (0.3 MG) BY INTRAMUSCULAR ROUTE ONCE AS NEEDED FOR ANAPHYLAXIS      TRELEGY ELLIPTA 100-62.5-25 MCG/ACT AEPB inhaler Inhale 1 puff into the lungs daily      benzonatate (TESSALON) 100 MG capsule Take 2 capsules by mouth 3 times daily as needed for Cough 60 capsule 0    tiotropium-olodaterol (STIOLTO RESPIMAT) 2.5-2.5 MCG/ACT AERS Inhale 2 puffs into the lungs daily 1 each 5    albuterol sulfate HFA (VENTOLIN HFA) 108 (90 Base) MCG/ACT inhaler Inhale 2 puffs into the lungs 4 times daily 18 g 11    Cholecalciferol (VITAMIN D3) 50 MCG (2000 UT) TABS Take by mouth daily      Probiotic Product (PROBIOTIC-10 PO) Take by mouth daily      vitamin B-12 (CYANOCOBALAMIN) 1000 MCG tablet Take 1 tablet by mouth daily

## 2024-05-02 ENCOUNTER — OFFICE VISIT (OUTPATIENT)
Age: 69
End: 2024-05-02
Payer: MEDICARE

## 2024-05-02 VITALS
WEIGHT: 196.2 LBS | RESPIRATION RATE: 18 BRPM | HEART RATE: 77 BPM | BODY MASS INDEX: 33.49 KG/M2 | SYSTOLIC BLOOD PRESSURE: 116 MMHG | DIASTOLIC BLOOD PRESSURE: 88 MMHG | HEIGHT: 64 IN

## 2024-05-02 DIAGNOSIS — E04.1 THYROID NODULE: ICD-10-CM

## 2024-05-02 DIAGNOSIS — E03.8 OTHER SPECIFIED HYPOTHYROIDISM: Primary | ICD-10-CM

## 2024-05-02 DIAGNOSIS — E66.9 OBESITY (BMI 30.0-34.9): ICD-10-CM

## 2024-05-02 PROCEDURE — G9899 SCRN MAM PERF RSLTS DOC: HCPCS | Performed by: INTERNAL MEDICINE

## 2024-05-02 PROCEDURE — 1123F ACP DISCUSS/DSCN MKR DOCD: CPT | Performed by: INTERNAL MEDICINE

## 2024-05-02 PROCEDURE — G8427 DOCREV CUR MEDS BY ELIG CLIN: HCPCS | Performed by: INTERNAL MEDICINE

## 2024-05-02 PROCEDURE — 3017F COLORECTAL CA SCREEN DOC REV: CPT | Performed by: INTERNAL MEDICINE

## 2024-05-02 PROCEDURE — 1090F PRES/ABSN URINE INCON ASSESS: CPT | Performed by: INTERNAL MEDICINE

## 2024-05-02 PROCEDURE — G8399 PT W/DXA RESULTS DOCUMENT: HCPCS | Performed by: INTERNAL MEDICINE

## 2024-05-02 PROCEDURE — 99204 OFFICE O/P NEW MOD 45 MIN: CPT | Performed by: INTERNAL MEDICINE

## 2024-05-02 PROCEDURE — G8417 CALC BMI ABV UP PARAM F/U: HCPCS | Performed by: INTERNAL MEDICINE

## 2024-05-02 PROCEDURE — 1036F TOBACCO NON-USER: CPT | Performed by: INTERNAL MEDICINE

## 2024-05-02 RX ORDER — OMEPRAZOLE 40 MG/1
CAPSULE, DELAYED RELEASE ORAL
COMMUNITY
Start: 2024-03-03

## 2024-05-02 RX ORDER — ATORVASTATIN CALCIUM 20 MG/1
20 TABLET, FILM COATED ORAL DAILY
COMMUNITY

## 2024-05-02 ASSESSMENT — ENCOUNTER SYMPTOMS
SHORTNESS OF BREATH: 0
VOMITING: 0
CONSTIPATION: 1
DIARRHEA: 0
TROUBLE SWALLOWING: 0
COUGH: 0
BLOOD IN STOOL: 0
NAUSEA: 0
BACK PAIN: 0
EYE PAIN: 0
WHEEZING: 0

## 2024-05-02 NOTE — PROGRESS NOTES
Patient Name: Maria Luisa Quesada  YOB: 1955  MRN: [id number]  Office visit date: 5/2/2024    Chief Complaint: New Patient      Subjective/Objective:    HPI:     Maria Luisa Quesada is a 69 y.o. female who presents for a new patient visit. She is here for evaluation of New Patient    Reported hx hypothyroid which she has been taking Synthroid 50 mcg with water in empty stomach. Has been working as robotic welding and recently retired. Patient has angioedema which she was admitting into the hospital. US soft tissue of the neck show incidental finding showed 4 x 4 x 4 mm calcified nodule in right thyroid lobe. Total 6 ACR category TR4. Patient report occasional dysphagia with 1 episode of feeling food stuck and required water. Report constipation, and some hair loss, llack of energy, Reported 5-6 lbs weight gain, blurry vision (lst eyes exam 8 months ago) . Denied palpitation, diarrhea, headaches. TSH 0.495 (2/2024)    Seen by ENT without significant finding but recommend sleep with elevate head of the bed suggest GERD.     Former smoker. Smoke 4 packs/day x 25-30 years. Stopped smoking 2011.   Denied alcohol intake. Living at home and can perform all daily activity.    No family member with thyroid cancer. Dad, bro with DM2. Retired.     Past Medical History:   Diagnosis Date    Colon polyps 2010    several removed-Dr. Garcia (no path available)    HTN (hypertension)     Hyperlipidemia     Hyperplastic colon polyp 07/10/2017    2 sessiled polyps removed-Dr. Walsh    Hypothyroid     Papilloma 2022    left breast       Past Surgical History:   Procedure Laterality Date    BACK SURGERY  07/01/2011    BREAST LUMPECTOMY Left 03/28/2022    LEFT BREAST LUMPECTOMY WITH PREOP NEEDLE LOC performed by Richmond Syk MD at [facility] OR    BREAST SURGERY Left 11/02/2005    Jose benign    BREAST SURGERY Left 04/24/2023    EXCISION LEFT BREAST MASS WITH PRIOR NEEDLE LOC performed by Richmond Sky MD at [facility] OR

## 2024-05-08 ENCOUNTER — HOSPITAL ENCOUNTER (OUTPATIENT)
Age: 69
Discharge: HOME | End: 2024-05-08
Payer: MEDICARE

## 2024-05-08 DIAGNOSIS — E03.8 OTHER SPECIFIED HYPOTHYROIDISM: ICD-10-CM

## 2024-05-08 LAB — T4 FREE SERPL-MCNC: 1.51 NG/DL (ref 0.93–1.68)

## 2024-05-08 PROCEDURE — 84439 ASSAY OF FREE THYROXINE: CPT

## 2024-05-08 PROCEDURE — 36415 COLL VENOUS BLD VENIPUNCTURE: CPT

## 2024-05-08 PROCEDURE — 84443 ASSAY THYROID STIM HORMONE: CPT

## 2024-05-15 ENCOUNTER — PROCEDURE VISIT (OUTPATIENT)
Dept: ALLERGY | Age: 69
End: 2024-05-15
Payer: MEDICARE

## 2024-05-15 VITALS
BODY MASS INDEX: 34.18 KG/M2 | SYSTOLIC BLOOD PRESSURE: 137 MMHG | OXYGEN SATURATION: 93 % | DIASTOLIC BLOOD PRESSURE: 77 MMHG | RESPIRATION RATE: 18 BRPM | WEIGHT: 196 LBS | HEART RATE: 73 BPM

## 2024-05-15 DIAGNOSIS — Z91.018 FOOD ALLERGY: ICD-10-CM

## 2024-05-15 DIAGNOSIS — T78.2XXA ANAPHYLAXIS, INITIAL ENCOUNTER: ICD-10-CM

## 2024-05-15 DIAGNOSIS — J30.81 CAT ALLERGIES: ICD-10-CM

## 2024-05-15 DIAGNOSIS — Z91.038 ALLERGY TO COCKROACHES: ICD-10-CM

## 2024-05-15 DIAGNOSIS — Z91.09 MITE ALLERGY: ICD-10-CM

## 2024-05-15 DIAGNOSIS — J30.89 ALLERGIC RHINITIS CAUSED BY FEATHERS: ICD-10-CM

## 2024-05-15 DIAGNOSIS — J30.1 ALLERGY TO TREES: ICD-10-CM

## 2024-05-15 DIAGNOSIS — J30.89 ALLERGIC RHINITIS CAUSED BY MOLD: ICD-10-CM

## 2024-05-15 DIAGNOSIS — T78.2XXA ANAPHYLAXIS, INITIAL ENCOUNTER: Primary | ICD-10-CM

## 2024-05-15 DIAGNOSIS — Z91.048 ALLERGY TO MOLD: ICD-10-CM

## 2024-05-15 DIAGNOSIS — J30.81 ANIMAL DANDER ALLERGY: ICD-10-CM

## 2024-05-15 DIAGNOSIS — J30.81 ALLERGY TO DOG DANDER: ICD-10-CM

## 2024-05-15 PROCEDURE — 1123F ACP DISCUSS/DSCN MKR DOCD: CPT | Performed by: NURSE PRACTITIONER

## 2024-05-15 PROCEDURE — 99215 OFFICE O/P EST HI 40 MIN: CPT | Performed by: NURSE PRACTITIONER

## 2024-05-15 PROCEDURE — G8417 CALC BMI ABV UP PARAM F/U: HCPCS | Performed by: NURSE PRACTITIONER

## 2024-05-15 PROCEDURE — 1036F TOBACCO NON-USER: CPT | Performed by: NURSE PRACTITIONER

## 2024-05-15 PROCEDURE — 1090F PRES/ABSN URINE INCON ASSESS: CPT | Performed by: NURSE PRACTITIONER

## 2024-05-15 PROCEDURE — 95004 PERQ TESTS W/ALRGNC XTRCS: CPT | Performed by: NURSE PRACTITIONER

## 2024-05-15 PROCEDURE — 3017F COLORECTAL CA SCREEN DOC REV: CPT | Performed by: NURSE PRACTITIONER

## 2024-05-15 PROCEDURE — G8399 PT W/DXA RESULTS DOCUMENT: HCPCS | Performed by: NURSE PRACTITIONER

## 2024-05-15 PROCEDURE — G9899 SCRN MAM PERF RSLTS DOC: HCPCS | Performed by: NURSE PRACTITIONER

## 2024-05-15 PROCEDURE — G8427 DOCREV CUR MEDS BY ELIG CLIN: HCPCS | Performed by: NURSE PRACTITIONER

## 2024-05-15 RX ORDER — CETIRIZINE HYDROCHLORIDE 10 MG/1
10 TABLET ORAL DAILY
Qty: 90 TABLET | Refills: 3 | COMMUNITY
Start: 2024-05-15

## 2024-05-15 RX ORDER — GLUCAGON HYDROCHLORIDE 1 MG
1 KIT INJECTION ONCE
Qty: 1 EACH | Refills: 0 | Status: SHIPPED | OUTPATIENT
Start: 2024-05-15 | End: 2024-05-15

## 2024-05-15 ASSESSMENT — ENCOUNTER SYMPTOMS
EYE DISCHARGE: 1
EYE REDNESS: 1
EYE ITCHING: 1

## 2024-05-15 NOTE — PATIENT INSTRUCTIONS
[facility]  Dr. Shraddha Hidalgo  [address]  [place] [id number]  [phone]  Allergy Injection Guide      Dear Patient,    Your doctor has recommended allergy shots (immunotherapy or I.T.) for the treatment of your allergies. Considerable dedication is required of the patient/family who have agreed to immunotherapy. The injections are weekly or bi-weekly injections until a high dose of allergen is reached. Only after a high or “maintenance dose” is reached will the full benefit of the injections be appreciated. It may take up to a year for symptom relief to occur. The full effect of the treatment will need to be adhered to for a minimum of three years. Following the weekly build-up dose, maintenance injections are slowly tapered to a minimum of once a month injections. Missed shots or reactions to shots can delay reaching maintenance dosage. A six-month follow-up visit with the provider is required in order to assess progress; yearly visits are then required. Please inform our office if you feel that the injections are not working. Let us know as soon as possible if you become pregnant. We will not build up your doses until after your baby is born. As with any long-term treatment or therapy, yearly evaluations and assessments by your provider are necessary to optimize and safely monitor your response. Your provider will periodically review with you your progress at scheduled appointments. However, situations may arise in between visits that may be important to the success of your treatment. Therefore, we ask your cooperation in alerting the injection personnel before receiving an allergy injection of any changes in your health (heart disease, diabetes, cancer, pregnancy etc.) or medications (specifically beta-blockers prescribed by other providers and any over-the-counter, homeopathic, herbal or alternative medicines). This booklet is designed as a resource for you to refer to, for

## 2024-05-15 NOTE — PROGRESS NOTES
inform you when you can reduce to weekly injections based on your schedule and after the nurse/medical assistant speaks with the provider.  10. Ordering antigen. Insurances will require us to make up either a 3 or 6-month supply of antigen.   11. Vacations/Missed Doses. Please get an injection immediately before leaving on vacation and upon return. This minimizes the impact of being off your injections for an extended period of time.   12. Please notify us of any new medications prescribed by other providers. Some medications (specifically beta-blockers meds ending in “lol” as in “Atenolol”, these medications are contraindicated if on allergy injections. If you are on a beta-blocker medication, we will have to discontinue allergy injections and request that you see your provider about a possible change in medications.  13. Do not exercise or play sports 1 hour before or after receiving an allergy injection.  14. Do not get another injection/immunization on the same day (i.e. MMR, DPT, Influenza, Pneumonia, etc.)  15. Women - Please notify us immediately if you become pregnant.  We prefer to STOP allergy injections during the time of your pregnancy.  16. Please notify us immediately of any address or insurance change. If we are not notified of insurance changes you may be liable for charges incurred.  Do not get an injection if:   Running a fever   Experiencing asthma symptoms of any degree   Feel ill enough to miss school/work   Have a rash or hives.  Call if you have any questions about receiving allergy injections when sick. (See Frequently Asked Questions).  17.  Always have your Epi-pen or Auvi-Q with you.      For Patients Receiving Allergy Shots Outside Our Office  At times patients request to receive injections at an office other than one of ours.  We are happy to work with your PCP so that you may receive injections. In these instances, certain things will become your responsibility to facilitate this

## 2024-05-28 ENCOUNTER — HOSPITAL ENCOUNTER (OUTPATIENT)
Age: 69
Discharge: HOME | End: 2024-05-28
Payer: MEDICARE

## 2024-05-28 LAB
ANION GAP SERPL CALC-SCNC: 14 MEQ/L (ref 8–16)
BUN SERPL-MCNC: 16 MG/DL (ref 7–22)
CALCIUM SERPL-MCNC: 9.7 MG/DL (ref 8.5–10.5)
CHLORIDE SERPL-SCNC: 98 MEQ/L (ref 98–111)
CO2 SERPL-SCNC: 27 MEQ/L (ref 23–33)
CREAT SERPL-MCNC: 0.5 MG/DL (ref 0.4–1.2)
GFR SERPL CREATININE-BSD FRML MDRD: > 90 ML/MIN/1.73M2
GLUCOSE SERPL-MCNC: 106 MG/DL (ref 70–108)
POTASSIUM SERPL-SCNC: 4.3 MEQ/L (ref 3.5–5.2)
SODIUM SERPL-SCNC: 139 MEQ/L (ref 135–145)

## 2024-05-28 PROCEDURE — 80048 BASIC METABOLIC PNL TOTAL CA: CPT

## 2024-05-28 PROCEDURE — 36415 COLL VENOUS BLD VENIPUNCTURE: CPT

## 2024-06-03 ENCOUNTER — HOSPITAL ENCOUNTER (OUTPATIENT)
Age: 69
Discharge: HOME | End: 2024-06-03
Payer: MEDICARE

## 2024-06-03 LAB
ALBUMIN SERPL BCG-MCNC: 4 G/DL (ref 3.5–5.1)
ALP SERPL-CCNC: 104 U/L (ref 38–126)
ALT SERPL W/O P-5'-P-CCNC: 21 U/L (ref 11–66)
ANION GAP SERPL CALC-SCNC: 11 MEQ/L (ref 8–16)
AST SERPL-CCNC: 18 U/L (ref 5–40)
BILIRUB SERPL-MCNC: 0.8 MG/DL (ref 0.3–1.2)
BUN SERPL-MCNC: 11 MG/DL (ref 7–22)
CALCIUM SERPL-MCNC: 8.9 MG/DL (ref 8.5–10.5)
CHLORIDE SERPL-SCNC: 107 MEQ/L (ref 98–111)
CHOLEST SERPL-MCNC: 135 MG/DL (ref 100–199)
CO2 SERPL-SCNC: 24 MEQ/L (ref 23–33)
CREAT SERPL-MCNC: 0.4 MG/DL (ref 0.4–1.2)
DEPRECATED RDW RBC AUTO: 43.5 FL (ref 35–45)
ERYTHROCYTE [DISTWIDTH] IN BLOOD BY AUTOMATED COUNT: 12.6 % (ref 11.5–14.5)
GFR SERPL CREATININE-BSD FRML MDRD: > 90 ML/MIN/1.73M2
GLUCOSE SERPL-MCNC: 95 MG/DL (ref 70–108)
HCT VFR BLD AUTO: 44 % (ref 37–47)
HDLC SERPL-MCNC: 42 MG/DL
HGB BLD-MCNC: 14.4 GM/DL (ref 12–16)
LDLC SERPL CALC-MCNC: 67 MG/DL
MCH RBC QN AUTO: 30.6 PG (ref 26–33)
MCHC RBC AUTO-ENTMCNC: 32.7 GM/DL (ref 32.2–35.5)
MCV RBC AUTO: 93.4 FL (ref 81–99)
PLATELET # BLD AUTO: 251 THOU/MM3 (ref 130–400)
PMV BLD AUTO: 9.9 FL (ref 9.4–12.4)
POTASSIUM SERPL-SCNC: 4 MEQ/L (ref 3.5–5.2)
PROT SERPL-MCNC: 6.8 G/DL (ref 6.1–8)
RBC # BLD AUTO: 4.71 MILL/MM3 (ref 4.2–5.4)
SODIUM SERPL-SCNC: 142 MEQ/L (ref 135–145)
TRIGL SERPL-MCNC: 130 MG/DL (ref 0–199)
TSH SERPL DL<=0.005 MIU/L-ACNC: 0.52 UIU/ML (ref 0.4–4.2)
WBC # BLD AUTO: 5.1 THOU/MM3 (ref 4.8–10.8)

## 2024-06-03 PROCEDURE — 80053 COMPREHEN METABOLIC PANEL: CPT

## 2024-06-03 PROCEDURE — 84443 ASSAY THYROID STIM HORMONE: CPT

## 2024-06-03 PROCEDURE — 80061 LIPID PANEL: CPT

## 2024-06-03 PROCEDURE — 85027 COMPLETE CBC AUTOMATED: CPT

## 2024-06-03 PROCEDURE — 36415 COLL VENOUS BLD VENIPUNCTURE: CPT

## 2024-06-06 ENCOUNTER — HOSPITAL ENCOUNTER (OUTPATIENT)
Age: 69
Discharge: HOME | End: 2024-06-08
Payer: MEDICARE

## 2024-06-06 DIAGNOSIS — R00.0 TACHYCARDIA, UNSPECIFIED: ICD-10-CM

## 2024-06-06 PROCEDURE — 93225 XTRNL ECG REC<48 HRS REC: CPT

## 2024-06-18 ENCOUNTER — HOSPITAL ENCOUNTER (OUTPATIENT)
Age: 69
Discharge: HOME | End: 2024-06-20
Payer: MEDICARE

## 2024-06-18 VITALS
HEIGHT: 63 IN | WEIGHT: 196 LBS | BODY MASS INDEX: 34.73 KG/M2 | DIASTOLIC BLOOD PRESSURE: 77 MMHG | SYSTOLIC BLOOD PRESSURE: 137 MMHG

## 2024-06-18 DIAGNOSIS — I47.11 INAPPROPRIATE SINUS TACHYCARDIA (HCC): ICD-10-CM

## 2024-06-18 DIAGNOSIS — R00.0 TACHYCARDIA, UNSPECIFIED: ICD-10-CM

## 2024-06-18 LAB
ECHO AO ASC DIAM: 2.6 CM
ECHO AO ASCENDING AORTA INDEX: 1.35 CM/M2
ECHO AO SINUS VALSALVA DIAM: 3 CM
ECHO AO SINUS VALSALVA INDEX: 1.56 CM/M2
ECHO AO ST JNCT DIAM: 2.4 CM
ECHO AV CUSP MM: 1.7 CM
ECHO AV MEAN GRADIENT: 4 MMHG
ECHO AV MEAN VELOCITY: 0.9 M/S
ECHO AV PEAK GRADIENT: 8 MMHG
ECHO AV PEAK VELOCITY: 1.5 M/S
ECHO AV VELOCITY RATIO: 0.73
ECHO AV VTI: 27.3 CM
ECHO BSA: 1.99 M2
ECHO EST RA PRESSURE: 5 MMHG
ECHO LA AREA 2C: 12.9 CM2
ECHO LA AREA 4C: 15.4 CM2
ECHO LA DIAMETER INDEX: 1.77 CM/M2
ECHO LA DIAMETER: 3.4 CM
ECHO LA MAJOR AXIS: 5.5 CM
ECHO LA MINOR AXIS: 5 CM
ECHO LA VOL BP: 32 ML (ref 22–52)
ECHO LA VOL MOD A2C: 28 ML (ref 22–52)
ECHO LA VOL MOD A4C: 34 ML (ref 22–52)
ECHO LA VOL/BSA BIPLANE: 17 ML/M2 (ref 16–34)
ECHO LA VOLUME INDEX MOD A2C: 15 ML/M2 (ref 16–34)
ECHO LA VOLUME INDEX MOD A4C: 18 ML/M2 (ref 16–34)
ECHO LV E' LATERAL VELOCITY: 7 CM/S
ECHO LV E' SEPTAL VELOCITY: 9 CM/S
ECHO LV FRACTIONAL SHORTENING: 36 % (ref 28–44)
ECHO LV INTERNAL DIMENSION DIASTOLE INDEX: 2.29 CM/M2
ECHO LV INTERNAL DIMENSION DIASTOLIC: 4.4 CM (ref 3.9–5.3)
ECHO LV INTERNAL DIMENSION SYSTOLIC INDEX: 1.46 CM/M2
ECHO LV INTERNAL DIMENSION SYSTOLIC: 2.8 CM
ECHO LV ISOVOLUMETRIC RELAXATION TIME (IVRT): 71 MS
ECHO LV IVSD: 1 CM (ref 0.6–0.9)
ECHO LV MASS 2D: 147.8 G (ref 67–162)
ECHO LV MASS INDEX 2D: 77 G/M2 (ref 43–95)
ECHO LV POSTERIOR WALL DIASTOLIC: 1 CM (ref 0.6–0.9)
ECHO LV RELATIVE WALL THICKNESS RATIO: 0.45
ECHO LVOT AV VTI INDEX: 0.62
ECHO LVOT MEAN GRADIENT: 2 MMHG
ECHO LVOT PEAK GRADIENT: 5 MMHG
ECHO LVOT PEAK VELOCITY: 1.1 M/S
ECHO LVOT VTI: 17 CM
ECHO MV A VELOCITY: 1.12 M/S
ECHO MV E DECELERATION TIME (DT): 185 MS
ECHO MV E VELOCITY: 0.8 M/S
ECHO MV E/A RATIO: 0.71
ECHO MV E/E' LATERAL: 11.43
ECHO MV E/E' RATIO (AVERAGED): 10.16
ECHO MV E/E' SEPTAL: 8.89
ECHO PV MAX VELOCITY: 0.6 M/S
ECHO PV PEAK GRADIENT: 2 MMHG
ECHO RIGHT VENTRICULAR SYSTOLIC PRESSURE (RVSP): 34 MMHG
ECHO RV FREE WALL PEAK S': 18 CM/S
ECHO RV INTERNAL DIMENSION: 2.8 CM
ECHO RV TAPSE: 2.1 CM (ref 1.7–?)
ECHO TV E WAVE: 0.5 M/S
ECHO TV REGURGITANT MAX VELOCITY: 2.71 M/S
ECHO TV REGURGITANT PEAK GRADIENT: 29 MMHG

## 2024-06-18 PROCEDURE — 93306 TTE W/DOPPLER COMPLETE: CPT

## 2024-06-25 LAB
ACQUISITION DURATION: NORMAL S
AVERAGE HEART RATE: 84 BPM
HOOKUP DATE: NORMAL
HOOKUP TIME: NORMAL
MAX HEART RATE TIME/DATE: NORMAL
MAX HEART RATE: 125 BPM
MIN HEART RATE TIME/DATE: NORMAL
MIN HEART RATE: 64 BPM
NUMBER OF QRS COMPLEXES: NORMAL
NUMBER OF SUPRAVENTRICULAR COUPLETS: 5
NUMBER OF SUPRAVENTRICULAR ECTOPICS: 161
NUMBER OF SUPRAVENTRICULAR ISOLATED BEATS: 139
NUMBER OF VENTRICULAR BIGEMINAL CYCLES: 0
NUMBER OF VENTRICULAR COUPLETS: 0
NUMBER OF VENTRICULAR ECTOPICS: 13

## 2024-08-07 ENCOUNTER — OFFICE VISIT (OUTPATIENT)
Age: 69
End: 2024-08-07
Payer: MEDICARE

## 2024-08-07 VITALS
HEIGHT: 63 IN | DIASTOLIC BLOOD PRESSURE: 82 MMHG | WEIGHT: 194.4 LBS | SYSTOLIC BLOOD PRESSURE: 132 MMHG | BODY MASS INDEX: 34.45 KG/M2 | HEART RATE: 83 BPM

## 2024-08-07 DIAGNOSIS — E03.9 ACQUIRED HYPOTHYROIDISM: Primary | ICD-10-CM

## 2024-08-07 DIAGNOSIS — E04.1 THYROID NODULE: ICD-10-CM

## 2024-08-07 PROCEDURE — 3017F COLORECTAL CA SCREEN DOC REV: CPT | Performed by: INTERNAL MEDICINE

## 2024-08-07 PROCEDURE — G8399 PT W/DXA RESULTS DOCUMENT: HCPCS | Performed by: INTERNAL MEDICINE

## 2024-08-07 PROCEDURE — 1123F ACP DISCUSS/DSCN MKR DOCD: CPT | Performed by: INTERNAL MEDICINE

## 2024-08-07 PROCEDURE — G8427 DOCREV CUR MEDS BY ELIG CLIN: HCPCS | Performed by: INTERNAL MEDICINE

## 2024-08-07 PROCEDURE — G8417 CALC BMI ABV UP PARAM F/U: HCPCS | Performed by: INTERNAL MEDICINE

## 2024-08-07 PROCEDURE — 1090F PRES/ABSN URINE INCON ASSESS: CPT | Performed by: INTERNAL MEDICINE

## 2024-08-07 PROCEDURE — 99214 OFFICE O/P EST MOD 30 MIN: CPT | Performed by: INTERNAL MEDICINE

## 2024-08-07 PROCEDURE — G9899 SCRN MAM PERF RSLTS DOC: HCPCS | Performed by: INTERNAL MEDICINE

## 2024-08-07 PROCEDURE — 1036F TOBACCO NON-USER: CPT | Performed by: INTERNAL MEDICINE

## 2024-08-07 NOTE — PROGRESS NOTES
Electronically signed by Sha Espana MD 8/7/2024 12:36 PM     **This report has been created using voice recognition software. It may   contain minor errors which are inherent in voice recognition technology.**

## 2024-10-22 ENCOUNTER — HOSPITAL ENCOUNTER (OUTPATIENT)
Dept: MAMMOGRAPHY | Age: 69
Discharge: HOME | End: 2024-10-22
Payer: MEDICARE

## 2024-10-22 DIAGNOSIS — Z12.31 VISIT FOR SCREENING MAMMOGRAM: ICD-10-CM

## 2024-10-22 PROCEDURE — 77063 BREAST TOMOSYNTHESIS BI: CPT

## 2024-11-22 ENCOUNTER — HOSPITAL ENCOUNTER (OUTPATIENT)
Age: 69
Discharge: HOME | End: 2024-11-22
Payer: MEDICARE

## 2024-11-22 LAB — TSH SERPL DL<=0.005 MIU/L-ACNC: 0.62 UIU/ML (ref 0.4–4.2)

## 2024-11-22 PROCEDURE — 84443 ASSAY THYROID STIM HORMONE: CPT

## 2024-11-22 PROCEDURE — 36415 COLL VENOUS BLD VENIPUNCTURE: CPT

## 2024-12-19 ENCOUNTER — HOSPITAL ENCOUNTER (OUTPATIENT)
Age: 69
Discharge: HOME | End: 2024-12-19
Payer: MEDICARE

## 2024-12-19 ENCOUNTER — HOSPITAL ENCOUNTER (OUTPATIENT)
Dept: GENERAL RADIOLOGY | Age: 69
Discharge: HOME | End: 2024-12-19
Payer: MEDICARE

## 2024-12-19 DIAGNOSIS — J40 BRONCHITIS: ICD-10-CM

## 2024-12-19 PROCEDURE — 71046 X-RAY EXAM CHEST 2 VIEWS: CPT

## 2025-02-03 ENCOUNTER — HOSPITAL ENCOUNTER (OUTPATIENT)
Dept: ULTRASOUND IMAGING | Age: 70
Discharge: HOME | End: 2025-02-03
Attending: INTERNAL MEDICINE
Payer: MEDICARE

## 2025-02-03 DIAGNOSIS — E03.9 ACQUIRED HYPOTHYROIDISM: ICD-10-CM

## 2025-02-03 DIAGNOSIS — E04.1 THYROID NODULE: ICD-10-CM

## 2025-02-03 PROCEDURE — 76536 US EXAM OF HEAD AND NECK: CPT

## 2025-02-05 ENCOUNTER — HOSPITAL ENCOUNTER (OUTPATIENT)
Age: 70
Discharge: HOME | End: 2025-02-05
Payer: MEDICARE

## 2025-02-05 DIAGNOSIS — E03.9 ACQUIRED HYPOTHYROIDISM: ICD-10-CM

## 2025-02-05 LAB
T4 FREE SERPL-MCNC: 1.42 NG/DL (ref 0.93–1.68)
TSH SERPL DL<=0.005 MIU/L-ACNC: 0.41 UIU/ML (ref 0.4–4.2)

## 2025-02-05 PROCEDURE — 84443 ASSAY THYROID STIM HORMONE: CPT

## 2025-02-05 PROCEDURE — 84439 ASSAY OF FREE THYROXINE: CPT

## 2025-02-05 PROCEDURE — 36415 COLL VENOUS BLD VENIPUNCTURE: CPT

## 2025-02-10 ENCOUNTER — TELEPHONE (OUTPATIENT)
Age: 70
End: 2025-02-10

## 2025-02-10 NOTE — TELEPHONE ENCOUNTER
----- Message from Dr. Sha Espana MD sent at 2/9/2025  9:00 PM EST -----  Ultrasound shows 2 small nodules on the right lobe.  One of them is new.  Keep existing appointment to follow.

## 2025-05-02 ENCOUNTER — TRANSCRIBE ORDERS (OUTPATIENT)
Dept: ADMINISTRATIVE | Age: 70
End: 2025-05-02

## 2025-05-02 DIAGNOSIS — R91.1 SOLITARY LUNG NODULE: Primary | ICD-10-CM

## 2025-05-14 ENCOUNTER — LAB (OUTPATIENT)
Dept: LAB | Age: 70
End: 2025-05-14

## 2025-05-14 LAB
T4 FREE SERPL-MCNC: 1.4 NG/DL (ref 0.92–1.68)
TSH SERPL DL<=0.05 MIU/L-ACNC: 0.61 UIU/ML (ref 0.27–4.2)

## 2025-05-15 LAB — T3FREE SERPL-MCNC: 3.57 PG/ML (ref 2–4.4)

## 2025-05-28 ENCOUNTER — HOSPITAL ENCOUNTER (OUTPATIENT)
Dept: CT IMAGING | Age: 70
Discharge: HOME | End: 2025-05-28
Payer: MEDICARE

## 2025-05-28 DIAGNOSIS — R91.1 SOLITARY LUNG NODULE: ICD-10-CM

## 2025-05-28 LAB — POC CREATININE WHOLE BLOOD: 0.7 MG/DL (ref 0.5–1.2)

## 2025-05-28 PROCEDURE — 82565 ASSAY OF CREATININE: CPT

## 2025-05-28 PROCEDURE — 71260 CT THORAX DX C+: CPT

## 2025-05-28 PROCEDURE — 6360000004 HC RX CONTRAST MEDICATION: Performed by: FAMILY MEDICINE

## 2025-05-28 RX ORDER — IOPAMIDOL 755 MG/ML
80 INJECTION, SOLUTION INTRAVASCULAR
Status: COMPLETED | OUTPATIENT
Start: 2025-05-28 | End: 2025-05-28

## 2025-05-28 RX ADMIN — IOPAMIDOL 80 ML: 755 INJECTION, SOLUTION INTRAVENOUS at 09:02

## 2025-05-29 ENCOUNTER — TELEPHONE (OUTPATIENT)
Dept: ENT CLINIC | Age: 70
End: 2025-05-29

## 2025-05-29 NOTE — TELEPHONE ENCOUNTER
I called and spoke with the patient and let her know what Gale said and I got the patient scheduled with Dr. Abreu. I told pt to call with any questions or concerns in the mean time. Pt verbalized understanding of this and thanked me for calling back.

## 2025-05-29 NOTE — TELEPHONE ENCOUNTER
Okay to schedule at available with another provider.  Please make sure she is addressing any potential issues with reflux, as it can cause or make the laryngitis worse.  If she has shortness or breath or noisy breathing (stridor), she needs to go to ER.

## 2025-05-29 NOTE — TELEPHONE ENCOUNTER
I received a call from Maria Luisa this morning about continuing throat issues.   She reports no pain, but says her voice fades in and out.  She had gallbladder surgery on 4/12 at [facility] and states that she ended up with pnuemonia and heart issues while there.   She is not sure if her throat got irritated during the intubation.  Her voice was quite shaky on the call and I struggled to hear her at some points.   She said she has had her throat stretched, but cannot recall the last time.   She had an EGD and colonoscopy as well, time undetermined.    She also said her ears clog and pop.  She said they drive her crazy.   She was a patient of Dr. Banks and is seeking to establish care again.   Please advise.

## 2025-06-02 ENCOUNTER — OFFICE VISIT (OUTPATIENT)
Age: 70
End: 2025-06-02
Payer: MEDICARE

## 2025-06-02 VITALS
BODY MASS INDEX: 34.46 KG/M2 | WEIGHT: 194.5 LBS | SYSTOLIC BLOOD PRESSURE: 118 MMHG | HEART RATE: 76 BPM | DIASTOLIC BLOOD PRESSURE: 80 MMHG | HEIGHT: 63 IN

## 2025-06-02 DIAGNOSIS — E03.9 ACQUIRED HYPOTHYROIDISM: Primary | ICD-10-CM

## 2025-06-02 DIAGNOSIS — E04.1 THYROID NODULE: ICD-10-CM

## 2025-06-02 PROCEDURE — G8427 DOCREV CUR MEDS BY ELIG CLIN: HCPCS | Performed by: INTERNAL MEDICINE

## 2025-06-02 PROCEDURE — 99214 OFFICE O/P EST MOD 30 MIN: CPT | Performed by: INTERNAL MEDICINE

## 2025-06-02 PROCEDURE — 1159F MED LIST DOCD IN RCRD: CPT | Performed by: INTERNAL MEDICINE

## 2025-06-02 PROCEDURE — G8399 PT W/DXA RESULTS DOCUMENT: HCPCS | Performed by: INTERNAL MEDICINE

## 2025-06-02 PROCEDURE — 1036F TOBACCO NON-USER: CPT | Performed by: INTERNAL MEDICINE

## 2025-06-02 PROCEDURE — 1123F ACP DISCUSS/DSCN MKR DOCD: CPT | Performed by: INTERNAL MEDICINE

## 2025-06-02 PROCEDURE — G8417 CALC BMI ABV UP PARAM F/U: HCPCS | Performed by: INTERNAL MEDICINE

## 2025-06-02 PROCEDURE — 3017F COLORECTAL CA SCREEN DOC REV: CPT | Performed by: INTERNAL MEDICINE

## 2025-06-02 PROCEDURE — 1090F PRES/ABSN URINE INCON ASSESS: CPT | Performed by: INTERNAL MEDICINE

## 2025-06-02 PROCEDURE — 1160F RVW MEDS BY RX/DR IN RCRD: CPT | Performed by: INTERNAL MEDICINE

## 2025-06-02 RX ORDER — LEVOTHYROXINE SODIUM 50 UG/1
50 TABLET ORAL DAILY
Qty: 90 TABLET | Refills: 1 | Status: SHIPPED | OUTPATIENT
Start: 2025-06-02

## 2025-06-02 RX ORDER — LEVOTHYROXINE SODIUM 50 UG/1
50 TABLET ORAL DAILY
COMMUNITY
Start: 2025-05-19 | End: 2025-06-02 | Stop reason: SDUPTHER

## 2025-06-02 RX ORDER — GINGER ROOT/GINGER ROOT EXT 262.5 MG
CAPSULE ORAL
COMMUNITY

## 2025-06-02 RX ORDER — FUROSEMIDE 20 MG/1
20 TABLET ORAL DAILY
COMMUNITY

## 2025-06-02 NOTE — PROGRESS NOTES
[facility] [place] [facility]  [facility]OCRINOLOG[facility]  [address]  [address]  [facility] [address]  Dept: [phone]  Loc: [phone]     Visit Date: 6/2/2025    Maria Luisa Quesada is a 70 y.o. female who presents today for:  Chief Complaint   Patient presents with    Follow-up     Thyroid nodule            Subjective:      HPI   History of Present Illness  The patient is a 70-year-old female who presents for evaluation of hypothyroidism.    She was diagnosed with hypothyroidism approximately 3 years ago and has been on a consistent regimen of levothyroxine 50 mcg since her last visit. She reports feeling well overall, with the exception of some weight gain. Her weight has fluctuated, reaching 200 pounds during a recent hospital stay. She does not experience constipation, although she takes MiraLAX daily to prevent it. She also reports feeling cold but does not have any symptoms of depression or changes in skin texture. She has noticed a change in her voice, which she believes is related to a previous endoscopy procedure. An EGD performed by her gastroenterologist revealed normal findings. She does not experience choking while swallowing. She has recently resumed her exercise routine at the [facility] after a month-long break following her surgery. She is currently on a 90-day supply of levothyroxine.    Past Medical History:   Diagnosis Date    Colon polyps 2010    several removed-Dr. Garcia (no path available)    HTN (hypertension)     Hyperlipidemia     Hyperplastic colon polyp 07/10/2017    2 sessiled polyps removed-Dr. Walsh    Hypothyroid     Papilloma 2022    left breast      Past Surgical History:   Procedure Laterality Date    BACK SURGERY  07/01/2011    BREAST LUMPECTOMY Left 03/28/2022    LEFT BREAST LUMPECTOMY WITH PREOP NEEDLE LOC performed by Richmond Sky MD at [facility] OR    BREAST SURGERY Left 11/02/2005    Jose benign    BREAST SURGERY Left 04/24/2023    EXCISION LEFT

## 2025-07-17 ENCOUNTER — OFFICE VISIT (OUTPATIENT)
Dept: ENT CLINIC | Age: 70
End: 2025-07-17

## 2025-07-17 VITALS
SYSTOLIC BLOOD PRESSURE: 128 MMHG | DIASTOLIC BLOOD PRESSURE: 70 MMHG | TEMPERATURE: 97.4 F | WEIGHT: 194.8 LBS | HEART RATE: 68 BPM | RESPIRATION RATE: 16 BRPM | OXYGEN SATURATION: 97 % | HEIGHT: 63 IN | BODY MASS INDEX: 34.52 KG/M2

## 2025-07-17 DIAGNOSIS — J38.3 VOCAL FOLD SCAR: ICD-10-CM

## 2025-07-17 DIAGNOSIS — R49.0 MUSCLE TENSION DYSPHONIA: ICD-10-CM

## 2025-07-17 DIAGNOSIS — K21.9 REFLUX LARYNGITIS: ICD-10-CM

## 2025-07-17 DIAGNOSIS — J38.7 PRESBYLARYNX: ICD-10-CM

## 2025-07-17 DIAGNOSIS — R49.0 DYSPHONIA: Primary | ICD-10-CM

## 2025-07-17 DIAGNOSIS — J04.0 REFLUX LARYNGITIS: ICD-10-CM

## 2025-07-17 NOTE — PROGRESS NOTES
[facility] [place] [facility]  [facility]  [address]   [address]  [facility] [address]  Dept: [phone]  Dept Fax: [phone]  Loc: [phone]    Maria Luisa Quesada is a 70 y.o. female who was referred byNo ref. provider found for:  Chief Complaint   Patient presents with    Follow-up     Patient is here for throat concerns and hoarseness. Patient states that she was intubated in April and after this she started throat issues. She said this time her voice has changed and she loses the voice.    .    HPI:     Maria Luisa Quesada is a 70 y.o. female who presents today for  hoarseness .  She has been evaluated by Dr. Hinds in the past and was diagnosed with reflux.  The hoarseness got worse after he had gallbladder surgery in April 2025.    History:     Allergies   Allergen Reactions    Diltiazem Rash     Other Reaction(s): Pruritic rash    Amlodipine     Fish-Derived Products Angioedema    Lisinopril Angioedema    No Known Allergies      Current Outpatient Medications   Medication Sig Dispense Refill    apixaban (ELIQUIS) 5 MG TABS tablet Take 1 tablet by mouth      calcium carb-cholecalciferol 600-20 MG-MCG TABS Take by mouth      furosemide (LASIX) 20 MG tablet Take 1 tablet by mouth daily      levothyroxine (SYNTHROID) 50 MCG tablet Take 1 tablet by mouth daily 90 tablet 1    atorvastatin (LIPITOR) 20 MG tablet Take 1 tablet by mouth daily as directed      omeprazole (PRILOSEC) 40 MG delayed release capsule TAKE 1 CAPSULE BY MOUTH EVERY DAY BEFORE A MEAL      metoprolol succinate (TOPROL XL) 25 MG extended release tablet Take 1 tablet by mouth daily      Ascorbic Acid 125 MG CHEW Take 500 mg by mouth daily      EPINEPHrine (EPIPEN) 0.3 MG/0.3ML SOAJ injection INJECT 0.3 MILLILITER (0.3 MG) BY INTRAMUSCULAR ROUTE ONCE AS NEEDED FOR ANAPHYLAXIS      TRELEGY ELLIPTA 100-62.5-25 MCG/ACT AEPB inhaler Inhale 1 puff into the lungs daily      Cholecalciferol (VITAMIN D3) 50 MCG

## 2025-07-18 ENCOUNTER — TRANSCRIBE ORDERS (OUTPATIENT)
Dept: ADMINISTRATIVE | Age: 70
End: 2025-07-18

## 2025-07-18 DIAGNOSIS — R09.02 HYPOXEMIA: Primary | ICD-10-CM

## 2025-07-20 RX ORDER — PANTOPRAZOLE SODIUM 40 MG/1
40 TABLET, DELAYED RELEASE ORAL
Qty: 90 TABLET | Refills: 1 | Status: SHIPPED | OUTPATIENT
Start: 2025-07-20

## 2025-07-21 ENCOUNTER — TELEPHONE (OUTPATIENT)
Dept: ENT CLINIC | Age: 70
End: 2025-07-21

## 2025-07-21 NOTE — TELEPHONE ENCOUNTER
----- Message from Dr. Tyshawn Abreu MD sent at 7/20/2025  5:40 PM EDT -----  Please call the patient and inform her that I sent a prescription of Pantoprazole (reflux medications) to her pharmacy. Make sure she is not on any other reflux medication such as Omeprazole. Let me know if you have any questions.

## 2025-07-21 NOTE — TELEPHONE ENCOUNTER
Patient called back and I let her know about the RX being sent and to stop the Omeprazole. Patient said she did not know a new Rx was sent. She thanked me and verbalized understanding.

## 2025-07-24 ENCOUNTER — TRANSCRIBE ORDERS (OUTPATIENT)
Dept: ADMINISTRATIVE | Age: 70
End: 2025-07-24

## 2025-07-24 DIAGNOSIS — R09.02 HYPOXEMIA: Primary | ICD-10-CM

## 2025-07-29 ENCOUNTER — HOSPITAL ENCOUNTER (OUTPATIENT)
Dept: SPEECH THERAPY | Age: 70
Setting detail: THERAPIES SERIES
Discharge: HOME | End: 2025-07-29
Payer: MEDICARE

## 2025-07-29 PROCEDURE — 92524 BEHAVRAL QUALIT ANALYS VOICE: CPT

## 2025-07-29 NOTE — THERAPY EVALUATION
[facility]  SPEECH THERAPY  [x] VOICE EVALUATION  [] DAILY NOTE   [] PROGRESS NOTE [] DISCHARGE NOTE    [] [facility] [place]   [] [facility]    [] 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  she has a past surgical history that includes Breast surgery (Left, 2005); US BREAST BIOPSY W LOC DEVICE EACH ADDL LESION LEFT (Left, 2022); PEG US GUIDED BREAST BIOPSY W LOC DEVICE 1ST LESION LEFT (Left, 2022); back surgery (2011); Carpal tunnel release (Left, 1992);  section; Foot surgery (Left, 2015); Foot surgery (Left, 2017); Colonoscopy (07/10/2017); cardiovascular stress test (); Tubal ligation; US PLACE BREAST LOC DEVICE 1ST LESION LEFT (Left, 2022); Breast lumpectomy (Left, 2022); US PLACE BREAST LOC DEVICE 1ST LESION LEFT (Left, 2023); Breast surgery (Left, 2023); Lung biopsy (Right, 2024); and Cholecystectomy (2025).     Allergies Allergies   Allergen Reactions    Diltiazem Rash     Other Reaction(s): Pruritic rash    Amlodipine     Fish-Derived Products Angioedema    Lisinopril Angioedema    No Known Allergies       Medications   Current Outpatient Medications:     pantoprazole (PROTONIX) 40 MG tablet, Take 1 tablet by mouth every 
caffeine

## 2025-07-30 ENCOUNTER — HOSPITAL ENCOUNTER (OUTPATIENT)
Dept: RESPIRATORY THERAPY | Age: 70
Discharge: HOME | End: 2025-07-30
Payer: MEDICARE

## 2025-07-30 DIAGNOSIS — R09.02 HYPOXEMIA: ICD-10-CM

## 2025-07-30 PROCEDURE — 94762 N-INVAS EAR/PLS OXIMTRY CONT: CPT

## 2025-08-12 ENCOUNTER — TELEPHONE (OUTPATIENT)
Dept: SPEECH THERAPY | Age: 70
End: 2025-08-12

## 2025-08-14 ENCOUNTER — LAB (OUTPATIENT)
Dept: LAB | Age: 70
End: 2025-08-14

## 2025-08-14 LAB
ALBUMIN SERPL BCG-MCNC: 4 G/DL (ref 3.4–4.9)
ALP SERPL-CCNC: 120 U/L (ref 38–126)
ALT SERPL W/O P-5'-P-CCNC: 36 U/L (ref 10–35)
ANION GAP SERPL CALC-SCNC: 15 MEQ/L (ref 8–16)
AST SERPL-CCNC: 28 U/L (ref 10–35)
BASOPHILS ABSOLUTE: 0 THOU/MM3 (ref 0–0.1)
BASOPHILS NFR BLD AUTO: 0.7 %
BILIRUB SERPL-MCNC: 1 MG/DL (ref 0.3–1.2)
BUN SERPL-MCNC: 19 MG/DL (ref 8–23)
CALCIUM SERPL-MCNC: 9.6 MG/DL (ref 8.5–10.5)
CHLORIDE SERPL-SCNC: 112 MEQ/L (ref 98–111)
CO2 SERPL-SCNC: 23 MEQ/L (ref 22–29)
CREAT SERPL-MCNC: 0.6 MG/DL (ref 0.5–0.9)
DEPRECATED RDW RBC AUTO: 44.4 FL (ref 35–45)
EOSINOPHIL NFR BLD AUTO: 1.5 %
EOSINOPHILS ABSOLUTE: 0.1 THOU/MM3 (ref 0–0.4)
ERYTHROCYTE [DISTWIDTH] IN BLOOD BY AUTOMATED COUNT: 13.2 % (ref 11.5–14.5)
GFR SERPL CREATININE-BSD FRML MDRD: > 90 ML/MIN/1.73M2
GLUCOSE SERPL-MCNC: 96 MG/DL (ref 74–109)
HCT VFR BLD AUTO: 44.1 % (ref 37–47)
HGB BLD-MCNC: 14.2 GM/DL (ref 12–16)
IMM GRANULOCYTES # BLD AUTO: 0.01 THOU/MM3 (ref 0–0.07)
IMM GRANULOCYTES NFR BLD AUTO: 0.2 %
LYMPHOCYTES ABSOLUTE: 1.9 THOU/MM3 (ref 1–4.8)
LYMPHOCYTES NFR BLD AUTO: 31.5 %
MCH RBC QN AUTO: 29.6 PG (ref 26–33)
MCHC RBC AUTO-ENTMCNC: 32.2 GM/DL (ref 32.2–35.5)
MCV RBC AUTO: 91.9 FL (ref 81–99)
MONOCYTES ABSOLUTE: 0.6 THOU/MM3 (ref 0.4–1.3)
MONOCYTES NFR BLD AUTO: 10 %
NEUTROPHILS ABSOLUTE: 3.4 THOU/MM3 (ref 1.8–7.7)
NEUTROPHILS NFR BLD AUTO: 56.1 %
NRBC BLD AUTO-RTO: 0 /100 WBC
PLATELET # BLD AUTO: 247 THOU/MM3 (ref 130–400)
PMV BLD AUTO: 9.7 FL (ref 9.4–12.4)
POTASSIUM SERPL-SCNC: 4 MEQ/L (ref 3.5–5.2)
PROT SERPL-MCNC: 6.5 G/DL (ref 6.4–8.3)
RBC # BLD AUTO: 4.8 MILL/MM3 (ref 4.2–5.4)
SODIUM SERPL-SCNC: 150 MEQ/L (ref 135–145)
TSH SERPL DL<=0.05 MIU/L-ACNC: 0.77 UIU/ML (ref 0.27–4.2)
VIT B12 SERPL-MCNC: 1195 PG/ML (ref 232–1245)
WBC # BLD AUTO: 6.1 THOU/MM3 (ref 4.8–10.8)

## 2025-08-18 ENCOUNTER — LAB (OUTPATIENT)
Dept: LAB | Age: 70
End: 2025-08-18

## 2025-08-18 LAB
ANION GAP SERPL CALC-SCNC: 12 MEQ/L (ref 8–16)
BUN SERPL-MCNC: 14 MG/DL (ref 8–23)
CALCIUM SERPL-MCNC: 9.5 MG/DL (ref 8.5–10.5)
CHLORIDE SERPL-SCNC: 103 MEQ/L (ref 98–111)
CO2 SERPL-SCNC: 24 MEQ/L (ref 22–29)
CREAT SERPL-MCNC: 0.6 MG/DL (ref 0.5–0.9)
GFR SERPL CREATININE-BSD FRML MDRD: > 90 ML/MIN/1.73M2
GLUCOSE SERPL-MCNC: 92 MG/DL (ref 74–109)
POTASSIUM SERPL-SCNC: 4 MEQ/L (ref 3.5–5.2)
SODIUM SERPL-SCNC: 139 MEQ/L (ref 135–145)

## 2025-08-19 ENCOUNTER — HOSPITAL ENCOUNTER (OUTPATIENT)
Dept: SPEECH THERAPY | Age: 70
Setting detail: THERAPIES SERIES
Discharge: HOME | End: 2025-08-19
Payer: MEDICARE

## 2025-08-19 PROCEDURE — 92507 TX SP LANG VOICE COMM INDIV: CPT

## 2025-08-27 ENCOUNTER — OFFICE VISIT (OUTPATIENT)
Age: 70
End: 2025-08-27
Payer: MEDICARE

## 2025-08-27 VITALS
DIASTOLIC BLOOD PRESSURE: 72 MMHG | SYSTOLIC BLOOD PRESSURE: 128 MMHG | HEIGHT: 63 IN | TEMPERATURE: 98.3 F | BODY MASS INDEX: 35.15 KG/M2 | OXYGEN SATURATION: 94 % | HEART RATE: 86 BPM | WEIGHT: 198.4 LBS

## 2025-08-27 DIAGNOSIS — R06.83 LOUD SNORING: ICD-10-CM

## 2025-08-27 DIAGNOSIS — G47.34 NOCTURNAL HYPOXEMIA: ICD-10-CM

## 2025-08-27 DIAGNOSIS — J44.9 STAGE 3 SEVERE CHRONIC OBSTRUCTIVE PULMONARY DISEASE BY GLOBAL INITIATIVE FOR CHRONIC OBSTRUCTIVE LUNG DISEASE CLASSIFICATION (HCC): Primary | ICD-10-CM

## 2025-08-27 DIAGNOSIS — E66.01 MORBID (SEVERE) OBESITY DUE TO EXCESS CALORIES (HCC): ICD-10-CM

## 2025-08-27 DIAGNOSIS — R06.09 DYSPNEA ON EXERTION: ICD-10-CM

## 2025-08-27 DIAGNOSIS — R29.818 SUSPECTED SLEEP APNEA: ICD-10-CM

## 2025-08-27 PROCEDURE — 99204 OFFICE O/P NEW MOD 45 MIN: CPT | Performed by: INTERNAL MEDICINE

## 2025-08-27 PROCEDURE — G8399 PT W/DXA RESULTS DOCUMENT: HCPCS | Performed by: INTERNAL MEDICINE

## 2025-08-27 PROCEDURE — 3017F COLORECTAL CA SCREEN DOC REV: CPT | Performed by: INTERNAL MEDICINE

## 2025-08-27 PROCEDURE — G8427 DOCREV CUR MEDS BY ELIG CLIN: HCPCS | Performed by: INTERNAL MEDICINE

## 2025-08-27 PROCEDURE — 1036F TOBACCO NON-USER: CPT | Performed by: INTERNAL MEDICINE

## 2025-08-27 PROCEDURE — 1123F ACP DISCUSS/DSCN MKR DOCD: CPT | Performed by: INTERNAL MEDICINE

## 2025-08-27 PROCEDURE — G8417 CALC BMI ABV UP PARAM F/U: HCPCS | Performed by: INTERNAL MEDICINE

## 2025-08-27 PROCEDURE — 1090F PRES/ABSN URINE INCON ASSESS: CPT | Performed by: INTERNAL MEDICINE

## 2025-08-27 PROCEDURE — 3023F SPIROM DOC REV: CPT | Performed by: INTERNAL MEDICINE

## 2025-08-28 ENCOUNTER — TELEPHONE (OUTPATIENT)
Dept: RESPIRATORY THERAPY | Age: 70
End: 2025-08-28

## (undated) DEVICE — SUTURE VCRL + SZ 4-0 L27IN ABSRB WHT FS-2 3/8 CIR REV CUT VCP422H

## (undated) DEVICE — BREAST HERNIA PACK: Brand: MEDLINE INDUSTRIES, INC.

## (undated) DEVICE — BREAST HERNIA: Brand: MEDLINE INDUSTRIES, INC.

## (undated) DEVICE — GLOVE SURG SZ 7.5 L11.73IN FNGR THK9.8MIL STRW LTX POLYMER

## (undated) DEVICE — SPECIMEN ORIENTATION CHARMS, SIX DISTINCTLY SHAPED STERILE 10MM CHARMS: Brand: MARGINMAP

## (undated) DEVICE — PENCIL SMK EVAC ALL IN 1 DSGN ENH VISIBILITY IMPROVED AIR

## (undated) DEVICE — SPONGE LAP W18XL18IN WHT COT 4 PLY FLD STRUNG RADPQ DISP ST

## (undated) DEVICE — SUTURE VCRL + SZ 3-0 L27IN ABSRB UD L26MM SH 1/2 CIR VCP416H

## (undated) DEVICE — SUTURE VCRL SZ 3-0 L27IN ABSRB VLT CT-2 L26MM 1/2 CIR J332H

## (undated) DEVICE — SUTURE VCRL + SZ 2-0 L27IN ABSRB WHT SH 1/2 CIR TAPERCUT VCP417H